# Patient Record
Sex: FEMALE | Race: WHITE | NOT HISPANIC OR LATINO
[De-identification: names, ages, dates, MRNs, and addresses within clinical notes are randomized per-mention and may not be internally consistent; named-entity substitution may affect disease eponyms.]

---

## 2017-01-04 ENCOUNTER — APPOINTMENT (OUTPATIENT)
Dept: ENDOCRINOLOGY | Facility: CLINIC | Age: 67
End: 2017-01-04

## 2017-06-08 ENCOUNTER — OUTPATIENT (OUTPATIENT)
Dept: OUTPATIENT SERVICES | Facility: HOSPITAL | Age: 67
LOS: 1 days | Discharge: HOME | End: 2017-06-08

## 2017-06-15 ENCOUNTER — APPOINTMENT (OUTPATIENT)
Dept: ENDOCRINOLOGY | Facility: CLINIC | Age: 67
End: 2017-06-15

## 2017-06-15 ENCOUNTER — OUTPATIENT (OUTPATIENT)
Dept: OUTPATIENT SERVICES | Facility: HOSPITAL | Age: 67
LOS: 1 days | Discharge: HOME | End: 2017-06-15

## 2017-06-15 VITALS
HEIGHT: 60 IN | SYSTOLIC BLOOD PRESSURE: 110 MMHG | HEART RATE: 75 BPM | BODY MASS INDEX: 39.27 KG/M2 | WEIGHT: 200 LBS | DIASTOLIC BLOOD PRESSURE: 68 MMHG

## 2017-06-17 RX ORDER — ASPIRIN ENTERIC COATED TABLETS 81 MG 81 MG/1
81 TABLET, DELAYED RELEASE ORAL DAILY
Qty: 100 | Refills: 2 | Status: ACTIVE | COMMUNITY
Start: 2017-06-15

## 2017-07-12 DIAGNOSIS — R52 PAIN, UNSPECIFIED: ICD-10-CM

## 2017-10-03 ENCOUNTER — OUTPATIENT (OUTPATIENT)
Dept: OUTPATIENT SERVICES | Facility: HOSPITAL | Age: 67
LOS: 1 days | Discharge: HOME | End: 2017-10-03

## 2017-10-03 DIAGNOSIS — E11.9 TYPE 2 DIABETES MELLITUS WITHOUT COMPLICATIONS: ICD-10-CM

## 2017-10-03 DIAGNOSIS — I50.9 HEART FAILURE, UNSPECIFIED: ICD-10-CM

## 2017-10-16 ENCOUNTER — OUTPATIENT (OUTPATIENT)
Dept: OUTPATIENT SERVICES | Facility: HOSPITAL | Age: 67
LOS: 1 days | Discharge: HOME | End: 2017-10-16

## 2017-10-16 DIAGNOSIS — I50.9 HEART FAILURE, UNSPECIFIED: ICD-10-CM

## 2017-11-07 ENCOUNTER — OUTPATIENT (OUTPATIENT)
Dept: OUTPATIENT SERVICES | Facility: HOSPITAL | Age: 67
LOS: 1 days | Discharge: HOME | End: 2017-11-07

## 2017-11-15 ENCOUNTER — OUTPATIENT (OUTPATIENT)
Dept: OUTPATIENT SERVICES | Facility: HOSPITAL | Age: 67
LOS: 1 days | Discharge: HOME | End: 2017-11-15

## 2017-11-15 DIAGNOSIS — Z12.31 ENCOUNTER FOR SCREENING MAMMOGRAM FOR MALIGNANT NEOPLASM OF BREAST: ICD-10-CM

## 2017-12-08 ENCOUNTER — OUTPATIENT (OUTPATIENT)
Dept: OUTPATIENT SERVICES | Facility: HOSPITAL | Age: 67
LOS: 1 days | Discharge: HOME | End: 2017-12-08

## 2017-12-08 DIAGNOSIS — E78.01 FAMILIAL HYPERCHOLESTEROLEMIA: ICD-10-CM

## 2017-12-08 DIAGNOSIS — E06.3 AUTOIMMUNE THYROIDITIS: ICD-10-CM

## 2017-12-08 DIAGNOSIS — E11.65 TYPE 2 DIABETES MELLITUS WITH HYPERGLYCEMIA: ICD-10-CM

## 2017-12-13 ENCOUNTER — APPOINTMENT (OUTPATIENT)
Dept: ENDOCRINOLOGY | Facility: CLINIC | Age: 67
End: 2017-12-13

## 2017-12-13 ENCOUNTER — OUTPATIENT (OUTPATIENT)
Dept: OUTPATIENT SERVICES | Facility: HOSPITAL | Age: 67
LOS: 1 days | Discharge: HOME | End: 2017-12-13

## 2017-12-13 VITALS
HEART RATE: 79 BPM | HEIGHT: 60 IN | BODY MASS INDEX: 39.07 KG/M2 | SYSTOLIC BLOOD PRESSURE: 135 MMHG | DIASTOLIC BLOOD PRESSURE: 78 MMHG | WEIGHT: 199 LBS

## 2018-04-10 ENCOUNTER — EMERGENCY (EMERGENCY)
Facility: HOSPITAL | Age: 68
LOS: 0 days | Discharge: HOME | End: 2018-04-11
Attending: EMERGENCY MEDICINE | Admitting: INTERNAL MEDICINE

## 2018-04-10 VITALS
RESPIRATION RATE: 16 BRPM | DIASTOLIC BLOOD PRESSURE: 72 MMHG | HEART RATE: 80 BPM | OXYGEN SATURATION: 99 % | SYSTOLIC BLOOD PRESSURE: 148 MMHG | TEMPERATURE: 98 F

## 2018-04-10 VITALS
SYSTOLIC BLOOD PRESSURE: 158 MMHG | RESPIRATION RATE: 17 BRPM | TEMPERATURE: 98 F | DIASTOLIC BLOOD PRESSURE: 72 MMHG | OXYGEN SATURATION: 99 % | HEART RATE: 74 BPM

## 2018-04-10 DIAGNOSIS — R07.9 CHEST PAIN, UNSPECIFIED: ICD-10-CM

## 2018-04-10 DIAGNOSIS — Z98.890 OTHER SPECIFIED POSTPROCEDURAL STATES: Chronic | ICD-10-CM

## 2018-04-10 DIAGNOSIS — E78.5 HYPERLIPIDEMIA, UNSPECIFIED: ICD-10-CM

## 2018-04-10 DIAGNOSIS — E11.9 TYPE 2 DIABETES MELLITUS WITHOUT COMPLICATIONS: ICD-10-CM

## 2018-04-10 DIAGNOSIS — J45.909 UNSPECIFIED ASTHMA, UNCOMPLICATED: ICD-10-CM

## 2018-04-10 DIAGNOSIS — I10 ESSENTIAL (PRIMARY) HYPERTENSION: ICD-10-CM

## 2018-04-10 DIAGNOSIS — R06.02 SHORTNESS OF BREATH: ICD-10-CM

## 2018-04-10 LAB
ALBUMIN SERPL ELPH-MCNC: 3.8 G/DL — SIGNIFICANT CHANGE UP (ref 3.5–5.2)
ALP SERPL-CCNC: 97 U/L — SIGNIFICANT CHANGE UP (ref 30–115)
ALT FLD-CCNC: 19 U/L — SIGNIFICANT CHANGE UP (ref 0–41)
ANION GAP SERPL CALC-SCNC: 12 MMOL/L — SIGNIFICANT CHANGE UP (ref 7–14)
APTT BLD: 30 SEC — SIGNIFICANT CHANGE UP (ref 27–39.2)
AST SERPL-CCNC: 23 U/L — SIGNIFICANT CHANGE UP (ref 0–41)
BASOPHILS # BLD AUTO: 0.03 K/UL — SIGNIFICANT CHANGE UP (ref 0–0.2)
BASOPHILS NFR BLD AUTO: 0.4 % — SIGNIFICANT CHANGE UP (ref 0–1)
BILIRUB SERPL-MCNC: 0.3 MG/DL — SIGNIFICANT CHANGE UP (ref 0.2–1.2)
BUN SERPL-MCNC: 21 MG/DL — HIGH (ref 10–20)
CALCIUM SERPL-MCNC: 9.1 MG/DL — SIGNIFICANT CHANGE UP (ref 8.5–10.1)
CHLORIDE SERPL-SCNC: 99 MMOL/L — SIGNIFICANT CHANGE UP (ref 98–110)
CO2 SERPL-SCNC: 27 MMOL/L — SIGNIFICANT CHANGE UP (ref 17–32)
CREAT SERPL-MCNC: 0.8 MG/DL — SIGNIFICANT CHANGE UP (ref 0.7–1.5)
EOSINOPHIL # BLD AUTO: 0.24 K/UL — SIGNIFICANT CHANGE UP (ref 0–0.7)
EOSINOPHIL NFR BLD AUTO: 2.9 % — SIGNIFICANT CHANGE UP (ref 0–8)
GLUCOSE SERPL-MCNC: 81 MG/DL — SIGNIFICANT CHANGE UP (ref 70–99)
HCT VFR BLD CALC: 35.1 % — LOW (ref 37–47)
HGB BLD-MCNC: 11.2 G/DL — LOW (ref 12–16)
IMM GRANULOCYTES NFR BLD AUTO: 0.4 % — HIGH (ref 0.1–0.3)
INR BLD: 0.95 RATIO — SIGNIFICANT CHANGE UP (ref 0.65–1.3)
LYMPHOCYTES # BLD AUTO: 1.7 K/UL — SIGNIFICANT CHANGE UP (ref 1.2–3.4)
LYMPHOCYTES # BLD AUTO: 20.6 % — SIGNIFICANT CHANGE UP (ref 20.5–51.1)
MAGNESIUM SERPL-MCNC: 2.1 MG/DL — SIGNIFICANT CHANGE UP (ref 1.8–2.4)
MCHC RBC-ENTMCNC: 27.1 PG — SIGNIFICANT CHANGE UP (ref 27–31)
MCHC RBC-ENTMCNC: 31.9 G/DL — LOW (ref 32–37)
MCV RBC AUTO: 84.8 FL — SIGNIFICANT CHANGE UP (ref 81–99)
MONOCYTES # BLD AUTO: 0.59 K/UL — SIGNIFICANT CHANGE UP (ref 0.1–0.6)
MONOCYTES NFR BLD AUTO: 7.2 % — SIGNIFICANT CHANGE UP (ref 1.7–9.3)
NEUTROPHILS # BLD AUTO: 5.65 K/UL — SIGNIFICANT CHANGE UP (ref 1.4–6.5)
NEUTROPHILS NFR BLD AUTO: 68.5 % — SIGNIFICANT CHANGE UP (ref 42.2–75.2)
NRBC # BLD: 0 /100 WBCS — SIGNIFICANT CHANGE UP (ref 0–0)
NT-PROBNP SERPL-SCNC: 204 PG/ML — SIGNIFICANT CHANGE UP (ref 0–300)
PLATELET # BLD AUTO: 278 K/UL — SIGNIFICANT CHANGE UP (ref 130–400)
POTASSIUM SERPL-MCNC: 4.1 MMOL/L — SIGNIFICANT CHANGE UP (ref 3.5–5)
POTASSIUM SERPL-SCNC: 4.1 MMOL/L — SIGNIFICANT CHANGE UP (ref 3.5–5)
PROT SERPL-MCNC: 6.8 G/DL — SIGNIFICANT CHANGE UP (ref 6–8)
PROTHROM AB SERPL-ACNC: 10.3 SEC — SIGNIFICANT CHANGE UP (ref 9.95–12.87)
RBC # BLD: 4.14 M/UL — LOW (ref 4.2–5.4)
RBC # FLD: 19.4 % — HIGH (ref 11.5–14.5)
SODIUM SERPL-SCNC: 138 MMOL/L — SIGNIFICANT CHANGE UP (ref 135–146)
TROPONIN T SERPL-MCNC: <0.01 NG/ML — SIGNIFICANT CHANGE UP
TROPONIN T SERPL-MCNC: <0.01 NG/ML — SIGNIFICANT CHANGE UP
WBC # BLD: 8.24 K/UL — SIGNIFICANT CHANGE UP (ref 4.8–10.8)
WBC # FLD AUTO: 8.24 K/UL — SIGNIFICANT CHANGE UP (ref 4.8–10.8)

## 2018-04-10 RX ORDER — ACETAMINOPHEN 500 MG
975 TABLET ORAL ONCE
Qty: 0 | Refills: 0 | Status: COMPLETED | OUTPATIENT
Start: 2018-04-10 | End: 2018-04-10

## 2018-04-10 RX ADMIN — Medication 975 MILLIGRAM(S): at 20:40

## 2018-04-10 NOTE — ED ADULT NURSE NOTE - OBJECTIVE STATEMENT
Pt presents to the ED with midsternal CP and SOB, worsening over the past few days, associated with b/l lower extremity swelling

## 2018-04-11 NOTE — ED PROVIDER NOTE - PHYSICAL EXAMINATION
VITAL SIGNS: I have reviewed nursing notes and confirm.  CONSTITUTIONAL: Well-developed; well-nourished; in no acute distress.  SKIN: Skin exam is warm and dry, no acute rash.  HEAD: Normocephalic; atraumatic.  EYES: PERRL, EOM intact; conjunctiva and sclera clear.  ENT: No nasal discharge; airway clear  NECK: Supple; non tender.  CARD:  Regular rate and rhythm.  RESP: No wheezes, rales or rhonchi.  ABD: Normal bowel sounds; soft; non-distended; non-tender; obese  EXT: Normal ROM. 1+ pitting edema to mid shin  NEURO: Alert, oriented. Grossly unremarkable. No focal deficits.  PSYCH: Cooperative, appropriate.

## 2018-04-11 NOTE — ED PROVIDER NOTE - NS ED ROS FT
Constitutional: no fever, chills, no recent weight loss  Cardiac: see HPI  Respiratory: see HPI  GI: No nausea, vomiting, diarrhea or abdominal pain.  : No dysuria, frequency, urgency or hematuria  MS: no pain to back or extremities, no loss of ROM, no weakness  Neuro: No headache or weakness.   Skin: No skin rash.  Except as documented in the HPI, all other systems are negative.

## 2018-04-11 NOTE — ED PROVIDER NOTE - MEDICAL DECISION MAKING DETAILS
66 yo F complicated history, chronic dyspnea, following with pulm here for chest pain described as sharp, intermittently over last 2 weeks. EKG is not acutely ischemic, dyspnea unchanged from previous, will check labs, likely trop x 2 given duration of sx and consider obs vs dc with close follow up.

## 2018-04-11 NOTE — ED PROVIDER NOTE - OBJECTIVE STATEMENT
68 yo F with history of HTN, HLD asthma, DM II, chronic dyspnea, following up with  66 yo F with history of HTN, HLD asthma, DM II, chronic dyspnea, following up with Dr. Teresa, here for chest pain over the last 2 weeks. Patient was being assessed by a nurse for yearly insurance physical and when she endorsed her sx to her she referred her to the ER. No nausea, vomiting, dizziness, new shortness of breath, diaphoresis.     No recent travel, PE history, recent trauma. Has chronic LE edema, no new swelling

## 2018-04-24 ENCOUNTER — OUTPATIENT (OUTPATIENT)
Dept: OUTPATIENT SERVICES | Facility: HOSPITAL | Age: 68
LOS: 1 days | Discharge: HOME | End: 2018-04-24

## 2018-04-24 DIAGNOSIS — Z98.890 OTHER SPECIFIED POSTPROCEDURAL STATES: Chronic | ICD-10-CM

## 2018-04-24 DIAGNOSIS — E11.9 TYPE 2 DIABETES MELLITUS WITHOUT COMPLICATIONS: ICD-10-CM

## 2018-05-18 ENCOUNTER — OUTPATIENT (OUTPATIENT)
Dept: OUTPATIENT SERVICES | Facility: HOSPITAL | Age: 68
LOS: 1 days | Discharge: HOME | End: 2018-05-18

## 2018-05-18 DIAGNOSIS — E66.9 OBESITY, UNSPECIFIED: ICD-10-CM

## 2018-05-18 DIAGNOSIS — E11.9 TYPE 2 DIABETES MELLITUS WITHOUT COMPLICATIONS: ICD-10-CM

## 2018-05-18 DIAGNOSIS — Z98.890 OTHER SPECIFIED POSTPROCEDURAL STATES: Chronic | ICD-10-CM

## 2018-05-18 DIAGNOSIS — E78.5 HYPERLIPIDEMIA, UNSPECIFIED: ICD-10-CM

## 2018-05-18 DIAGNOSIS — K21.9 GASTRO-ESOPHAGEAL REFLUX DISEASE WITHOUT ESOPHAGITIS: ICD-10-CM

## 2018-05-18 DIAGNOSIS — G47.33 OBSTRUCTIVE SLEEP APNEA (ADULT) (PEDIATRIC): ICD-10-CM

## 2018-05-18 DIAGNOSIS — I10 ESSENTIAL (PRIMARY) HYPERTENSION: ICD-10-CM

## 2018-05-30 ENCOUNTER — OUTPATIENT (OUTPATIENT)
Dept: OUTPATIENT SERVICES | Facility: HOSPITAL | Age: 68
LOS: 1 days | Discharge: HOME | End: 2018-05-30

## 2018-05-30 DIAGNOSIS — E11.65 TYPE 2 DIABETES MELLITUS WITH HYPERGLYCEMIA: ICD-10-CM

## 2018-05-30 DIAGNOSIS — Z98.890 OTHER SPECIFIED POSTPROCEDURAL STATES: Chronic | ICD-10-CM

## 2018-05-30 DIAGNOSIS — E06.3 AUTOIMMUNE THYROIDITIS: ICD-10-CM

## 2018-05-30 DIAGNOSIS — E78.00 PURE HYPERCHOLESTEROLEMIA, UNSPECIFIED: ICD-10-CM

## 2018-06-07 ENCOUNTER — OUTPATIENT (OUTPATIENT)
Dept: OUTPATIENT SERVICES | Facility: HOSPITAL | Age: 68
LOS: 1 days | Discharge: HOME | End: 2018-06-07

## 2018-06-07 ENCOUNTER — APPOINTMENT (OUTPATIENT)
Dept: ENDOCRINOLOGY | Facility: CLINIC | Age: 68
End: 2018-06-07

## 2018-06-07 VITALS
HEART RATE: 83 BPM | HEIGHT: 60 IN | SYSTOLIC BLOOD PRESSURE: 128 MMHG | DIASTOLIC BLOOD PRESSURE: 77 MMHG | BODY MASS INDEX: 41.23 KG/M2 | WEIGHT: 210 LBS

## 2018-06-07 DIAGNOSIS — Z98.890 OTHER SPECIFIED POSTPROCEDURAL STATES: Chronic | ICD-10-CM

## 2018-06-07 DIAGNOSIS — E66.01 MORBID (SEVERE) OBESITY DUE TO EXCESS CALORIES: ICD-10-CM

## 2018-06-07 DIAGNOSIS — E11.65 TYPE 2 DIABETES MELLITUS WITH HYPERGLYCEMIA: ICD-10-CM

## 2018-09-05 ENCOUNTER — OUTPATIENT (OUTPATIENT)
Dept: OUTPATIENT SERVICES | Facility: HOSPITAL | Age: 68
LOS: 1 days | Discharge: HOME | End: 2018-09-05

## 2018-09-05 DIAGNOSIS — Z98.890 OTHER SPECIFIED POSTPROCEDURAL STATES: Chronic | ICD-10-CM

## 2018-09-05 DIAGNOSIS — E11.65 TYPE 2 DIABETES MELLITUS WITH HYPERGLYCEMIA: ICD-10-CM

## 2018-09-05 DIAGNOSIS — I50.32 CHRONIC DIASTOLIC (CONGESTIVE) HEART FAILURE: ICD-10-CM

## 2018-09-05 PROBLEM — E78.00 PURE HYPERCHOLESTEROLEMIA, UNSPECIFIED: Chronic | Status: ACTIVE | Noted: 2018-04-10

## 2018-09-05 PROBLEM — E11.9 TYPE 2 DIABETES MELLITUS WITHOUT COMPLICATIONS: Chronic | Status: ACTIVE | Noted: 2018-04-10

## 2018-09-05 PROBLEM — I10 ESSENTIAL (PRIMARY) HYPERTENSION: Chronic | Status: ACTIVE | Noted: 2018-04-10

## 2018-09-05 PROBLEM — J45.909 UNSPECIFIED ASTHMA, UNCOMPLICATED: Chronic | Status: ACTIVE | Noted: 2018-04-10

## 2018-10-16 ENCOUNTER — OUTPATIENT (OUTPATIENT)
Dept: OUTPATIENT SERVICES | Facility: HOSPITAL | Age: 68
LOS: 1 days | Discharge: HOME | End: 2018-10-16

## 2018-10-16 DIAGNOSIS — Z98.890 OTHER SPECIFIED POSTPROCEDURAL STATES: Chronic | ICD-10-CM

## 2018-10-16 DIAGNOSIS — E11.65 TYPE 2 DIABETES MELLITUS WITH HYPERGLYCEMIA: ICD-10-CM

## 2018-10-16 DIAGNOSIS — I50.32 CHRONIC DIASTOLIC (CONGESTIVE) HEART FAILURE: ICD-10-CM

## 2018-10-23 ENCOUNTER — OUTPATIENT (OUTPATIENT)
Dept: OUTPATIENT SERVICES | Facility: HOSPITAL | Age: 68
LOS: 1 days | Discharge: HOME | End: 2018-10-23

## 2018-10-23 DIAGNOSIS — G25.0 ESSENTIAL TREMOR: ICD-10-CM

## 2018-10-23 DIAGNOSIS — Z98.890 OTHER SPECIFIED POSTPROCEDURAL STATES: Chronic | ICD-10-CM

## 2018-11-21 ENCOUNTER — OUTPATIENT (OUTPATIENT)
Dept: OUTPATIENT SERVICES | Facility: HOSPITAL | Age: 68
LOS: 1 days | Discharge: HOME | End: 2018-11-21

## 2018-11-21 DIAGNOSIS — G25.0 ESSENTIAL TREMOR: ICD-10-CM

## 2018-11-21 DIAGNOSIS — Z98.890 OTHER SPECIFIED POSTPROCEDURAL STATES: Chronic | ICD-10-CM

## 2018-11-21 DIAGNOSIS — G47.33 OBSTRUCTIVE SLEEP APNEA (ADULT) (PEDIATRIC): ICD-10-CM

## 2018-11-29 ENCOUNTER — OUTPATIENT (OUTPATIENT)
Dept: OUTPATIENT SERVICES | Facility: HOSPITAL | Age: 68
LOS: 1 days | Discharge: HOME | End: 2018-11-29

## 2018-11-29 DIAGNOSIS — Z98.890 OTHER SPECIFIED POSTPROCEDURAL STATES: Chronic | ICD-10-CM

## 2018-11-29 DIAGNOSIS — Z00.00 ENCOUNTER FOR GENERAL ADULT MEDICAL EXAMINATION WITHOUT ABNORMAL FINDINGS: ICD-10-CM

## 2018-11-29 DIAGNOSIS — E11.65 TYPE 2 DIABETES MELLITUS WITH HYPERGLYCEMIA: ICD-10-CM

## 2018-11-29 DIAGNOSIS — E03.8 OTHER SPECIFIED HYPOTHYROIDISM: ICD-10-CM

## 2018-11-30 LAB
ALBUMIN SERPL ELPH-MCNC: 4.2 G/DL
ALP BLD-CCNC: 104 U/L
ALT SERPL-CCNC: 40 U/L
ANION GAP SERPL CALC-SCNC: 16 MMOL/L
AST SERPL-CCNC: 40 U/L
BASOPHILS # BLD AUTO: 0.03 K/UL
BASOPHILS NFR BLD AUTO: 0.3 %
BILIRUB SERPL-MCNC: 0.3 MG/DL
BUN SERPL-MCNC: 16 MG/DL
CALCIUM SERPL-MCNC: 9.9 MG/DL
CHLORIDE SERPL-SCNC: 99 MMOL/L
CHOLEST SERPL-MCNC: 169 MG/DL
CHOLEST/HDLC SERPL: 2.4 RATIO
CO2 SERPL-SCNC: 30 MMOL/L
CREAT SERPL-MCNC: 1 MG/DL
CREAT SPEC-SCNC: 68 MG/DL
EOSINOPHIL # BLD AUTO: 0.28 K/UL
EOSINOPHIL NFR BLD AUTO: 3.2 %
GLUCOSE SERPL-MCNC: 133 MG/DL
HCT VFR BLD CALC: 40.4 %
HDLC SERPL-MCNC: 70 MG/DL
HGB BLD-MCNC: 12.5 G/DL
IMM GRANULOCYTES NFR BLD AUTO: 0.2 %
LDLC SERPL CALC-MCNC: 97 MG/DL
LYMPHOCYTES # BLD AUTO: 1.67 K/UL
LYMPHOCYTES NFR BLD AUTO: 18.9 %
MAN DIFF?: NORMAL
MCHC RBC-ENTMCNC: 27.5 PG
MCHC RBC-ENTMCNC: 30.9 G/DL
MCV RBC AUTO: 88.8 FL
MICROALBUMIN 24H UR DL<=1MG/L-MCNC: 1.2 MG/DL
MICROALBUMIN/CREAT 24H UR-RTO: 18 MG/G
MONOCYTES # BLD AUTO: 0.61 K/UL
MONOCYTES NFR BLD AUTO: 6.9 %
NEUTROPHILS # BLD AUTO: 6.24 K/UL
NEUTROPHILS NFR BLD AUTO: 70.5 %
PLATELET # BLD AUTO: 322 K/UL
POTASSIUM SERPL-SCNC: 4.7 MMOL/L
PROT SERPL-MCNC: 7.1 G/DL
RBC # BLD: 4.55 M/UL
RBC # FLD: 18.2 %
SODIUM SERPL-SCNC: 145 MMOL/L
TRIGL SERPL-MCNC: 114 MG/DL
TSH SERPL-ACNC: 4.58 UIU/ML
WBC # FLD AUTO: 8.85 K/UL

## 2018-12-20 ENCOUNTER — OUTPATIENT (OUTPATIENT)
Dept: OUTPATIENT SERVICES | Facility: HOSPITAL | Age: 68
LOS: 1 days | Discharge: HOME | End: 2018-12-20

## 2018-12-20 DIAGNOSIS — E11.9 TYPE 2 DIABETES MELLITUS WITHOUT COMPLICATIONS: ICD-10-CM

## 2018-12-20 DIAGNOSIS — Z98.890 OTHER SPECIFIED POSTPROCEDURAL STATES: Chronic | ICD-10-CM

## 2019-01-16 ENCOUNTER — OUTPATIENT (OUTPATIENT)
Dept: OUTPATIENT SERVICES | Facility: HOSPITAL | Age: 69
LOS: 1 days | Discharge: HOME | End: 2019-01-16

## 2019-01-16 ENCOUNTER — APPOINTMENT (OUTPATIENT)
Dept: ENDOCRINOLOGY | Facility: CLINIC | Age: 69
End: 2019-01-16

## 2019-01-16 VITALS
BODY MASS INDEX: 41.03 KG/M2 | HEIGHT: 60 IN | WEIGHT: 209 LBS | HEART RATE: 60 BPM | DIASTOLIC BLOOD PRESSURE: 80 MMHG | SYSTOLIC BLOOD PRESSURE: 120 MMHG

## 2019-01-16 DIAGNOSIS — Z98.890 OTHER SPECIFIED POSTPROCEDURAL STATES: Chronic | ICD-10-CM

## 2019-01-16 NOTE — ASSESSMENT
[Carbohydrate Consistent Diet] : carbohydrate consistent diet [Diabetes Foot Care] : diabetes foot care [Long Term Vascular Complications] : long term vascular complications of diabetes [FreeTextEntry1] : no changes, needs weight loss., increase l-thyroxine dosage.

## 2019-01-16 NOTE — HISTORY OF PRESENT ILLNESS
[FreeTextEntry1] : 69 yo female with pmh of hypothryoidism, DMII, htn, hld. cruz on cpap complainings of constipation, lethargy, hair thinning. Reports good control of BP. Review of blood sugar log shows blood glucose in range of24 today, peak 149 on current meds. \par Requesting refill of pioglitazone. Also on Trulicity and pioglitazone. \par \par med\par trulicity\par invokana 300\par synthroid 50

## 2019-01-28 ENCOUNTER — OUTPATIENT (OUTPATIENT)
Dept: OUTPATIENT SERVICES | Facility: HOSPITAL | Age: 69
LOS: 1 days | Discharge: HOME | End: 2019-01-28

## 2019-01-28 DIAGNOSIS — Z98.890 OTHER SPECIFIED POSTPROCEDURAL STATES: Chronic | ICD-10-CM

## 2019-01-28 DIAGNOSIS — E11.65 TYPE 2 DIABETES MELLITUS WITH HYPERGLYCEMIA: ICD-10-CM

## 2019-01-28 DIAGNOSIS — I50.32 CHRONIC DIASTOLIC (CONGESTIVE) HEART FAILURE: ICD-10-CM

## 2019-01-28 DIAGNOSIS — E11.9 TYPE 2 DIABETES MELLITUS WITHOUT COMPLICATIONS: ICD-10-CM

## 2019-07-01 ENCOUNTER — OUTPATIENT (OUTPATIENT)
Dept: OUTPATIENT SERVICES | Facility: HOSPITAL | Age: 69
LOS: 1 days | Discharge: HOME | End: 2019-07-01

## 2019-07-01 DIAGNOSIS — Z98.890 OTHER SPECIFIED POSTPROCEDURAL STATES: Chronic | ICD-10-CM

## 2019-07-15 ENCOUNTER — OUTPATIENT (OUTPATIENT)
Dept: OUTPATIENT SERVICES | Facility: HOSPITAL | Age: 69
LOS: 1 days | Discharge: HOME | End: 2019-07-15
Payer: MEDICARE

## 2019-07-15 DIAGNOSIS — Z98.890 OTHER SPECIFIED POSTPROCEDURAL STATES: Chronic | ICD-10-CM

## 2019-07-15 DIAGNOSIS — Z12.31 ENCOUNTER FOR SCREENING MAMMOGRAM FOR MALIGNANT NEOPLASM OF BREAST: ICD-10-CM

## 2019-07-15 PROCEDURE — 77067 SCR MAMMO BI INCL CAD: CPT | Mod: 26

## 2019-07-15 PROCEDURE — 77063 BREAST TOMOSYNTHESIS BI: CPT | Mod: 26

## 2019-07-16 DIAGNOSIS — M89.9 DISORDER OF BONE, UNSPECIFIED: ICD-10-CM

## 2019-07-16 DIAGNOSIS — Z13.820 ENCOUNTER FOR SCREENING FOR OSTEOPOROSIS: ICD-10-CM

## 2019-07-16 DIAGNOSIS — Z78.0 ASYMPTOMATIC MENOPAUSAL STATE: ICD-10-CM

## 2019-07-31 ENCOUNTER — OUTPATIENT (OUTPATIENT)
Dept: OUTPATIENT SERVICES | Facility: HOSPITAL | Age: 69
LOS: 1 days | Discharge: HOME | End: 2019-07-31

## 2019-07-31 ENCOUNTER — LABORATORY RESULT (OUTPATIENT)
Age: 69
End: 2019-07-31

## 2019-07-31 ENCOUNTER — OTHER (OUTPATIENT)
Age: 69
End: 2019-07-31

## 2019-07-31 ENCOUNTER — APPOINTMENT (OUTPATIENT)
Dept: ENDOCRINOLOGY | Facility: CLINIC | Age: 69
End: 2019-07-31

## 2019-07-31 VITALS
HEIGHT: 60 IN | SYSTOLIC BLOOD PRESSURE: 134 MMHG | DIASTOLIC BLOOD PRESSURE: 68 MMHG | WEIGHT: 200 LBS | HEART RATE: 68 BPM | BODY MASS INDEX: 39.27 KG/M2

## 2019-07-31 DIAGNOSIS — Z98.890 OTHER SPECIFIED POSTPROCEDURAL STATES: Chronic | ICD-10-CM

## 2019-07-31 NOTE — END OF VISIT
[>50% of Time Spent on Counseling and Coordination of Care for  ___] : Greater than 50% of the encounter time was spent on counseling and coordination of care for [unfilled] [] : Resident [Time Spent: ___ minutes] : I have spent [unfilled] minutes of face to face time with the patient

## 2019-07-31 NOTE — ASSESSMENT
[FreeTextEntry1] : 69 yo female with pmh of hypothryoidism, DMII, htn, hld. cruz on cpap here for follow-up appointment.\par \par #DM2, with some uncontrolled readings\par -Will change Trulicity to Ozempec\par -Check HBa1c, lipid profile, urinalysis, urine albumin/Cr, CBC, CMP\par \par #Hypothyroidism\par -Changed levothyroxine from 25 to 50 last visit\par -Did not do blood work last time, will repeat TSH\par \par #Obesity\par -Advised on importance of healthy diet and exercise\par \par #Follow-up appointment in 6 months [Carbohydrate Consistent Diet] : carbohydrate consistent diet [Long Term Vascular Complications] : long term vascular complications of diabetes [Diabetes Foot Care] : diabetes foot care [Hypoglycemia Management] : hypoglycemia management [Importance of Diet and Exercise] : importance of diet and exercise to improve glycemic control, achieve weight loss and improve cardiovascular health

## 2019-07-31 NOTE — REVIEW OF SYSTEMS
[Blurry Vision] : blurred vision [Polyuria] : polyuria [Fatigue] : no fatigue [Decreased Appetite] : appetite not decreased [Recent Weight Gain (___ Lbs)] : no recent weight gain [Recent Weight Loss (___ Lbs)] : no recent weight loss [Chest Pain] : no chest pain [Palpitations] : no palpitations [Shortness Of Breath] : no shortness of breath [Wheezing] : no wheezing was heard [Nausea] : no nausea [Vomiting] : no vomiting was observed [Constipation] : no constipation [Abdominal Pain] : no abdominal pain [Diarrhea] : no diarrhea [FreeTextEntry3] : Improves with eye glasses

## 2019-07-31 NOTE — PHYSICAL EXAM
[Alert] : alert [No Acute Distress] : no acute distress [Normal Sclera/Conjunctiva] : normal sclera/conjunctiva [No Proptosis] : no proptosis [Normal Hearing] : hearing was normal [Thyroid Not Enlarged] : the thyroid was not enlarged [No Neck Mass] : no neck mass was observed [Normal Rate] : heart rate was normal  [Normal S1, S2] : normal S1 and S2 [Regular Rhythm] : with a regular rhythm [No Stigmata of Cushings Syndrome] : no stigmata of cushings syndrome [Normal Gait] : normal gait [No Rash] : no rash

## 2019-07-31 NOTE — HISTORY OF PRESENT ILLNESS
[FreeTextEntry1] : 67 yo female with pmh of hypothryoidism, DMII, htn, hld. cruz on cpap here for follow-up appointment. Patient reports that for last 2 weeks she has ran out of Tulicity so has not been taking it. Had elevated BS this AM. Reports recent worsening thirst and increased urinary frequency. Denies any constipation, lethargy or hair thinning. [Hypoglycemia] : not hypoglycemic

## 2019-08-02 LAB
ALBUMIN SERPL ELPH-MCNC: 4.5 G/DL
ALP BLD-CCNC: 104 U/L
ALT SERPL-CCNC: 18 U/L
ANION GAP SERPL CALC-SCNC: 15 MMOL/L
AST SERPL-CCNC: 20 U/L
BASOPHILS # BLD AUTO: 0.04 K/UL
BASOPHILS NFR BLD AUTO: 0.4 %
BILIRUB SERPL-MCNC: 0.5 MG/DL
BUN SERPL-MCNC: 18 MG/DL
CALCIUM SERPL-MCNC: 10.5 MG/DL
CHLORIDE SERPL-SCNC: 94 MMOL/L
CHOLEST SERPL-MCNC: 206 MG/DL
CHOLEST/HDLC SERPL: 3.8 RATIO
CO2 SERPL-SCNC: 32 MMOL/L
CREAT SERPL-MCNC: 0.9 MG/DL
CREAT SPEC-SCNC: 60 MG/DL
EOSINOPHIL # BLD AUTO: 0.18 K/UL
EOSINOPHIL NFR BLD AUTO: 1.8 %
ESTIMATED AVERAGE GLUCOSE: 123 MG/DL
GLUCOSE SERPL-MCNC: 85 MG/DL
HBA1C MFR BLD HPLC: 5.9 %
HCT VFR BLD CALC: 42.2 %
HDLC SERPL-MCNC: 54 MG/DL
HGB BLD-MCNC: 13.8 G/DL
IMM GRANULOCYTES NFR BLD AUTO: 0.2 %
LDLC SERPL CALC-MCNC: 137 MG/DL
LYMPHOCYTES # BLD AUTO: 1.75 K/UL
LYMPHOCYTES NFR BLD AUTO: 17.4 %
MAN DIFF?: NORMAL
MCHC RBC-ENTMCNC: 27.9 PG
MCHC RBC-ENTMCNC: 32.7 G/DL
MCV RBC AUTO: 85.3 FL
MICROALBUMIN 24H UR DL<=1MG/L-MCNC: <1.2 MG/DL
MICROALBUMIN/CREAT 24H UR-RTO: NORMAL MG/G
MONOCYTES # BLD AUTO: 0.7 K/UL
MONOCYTES NFR BLD AUTO: 7 %
NEUTROPHILS # BLD AUTO: 7.38 K/UL
NEUTROPHILS NFR BLD AUTO: 73.2 %
PLATELET # BLD AUTO: 267 K/UL
POTASSIUM SERPL-SCNC: 3.7 MMOL/L
PROT SERPL-MCNC: 7.8 G/DL
RBC # BLD: 4.95 M/UL
RBC # FLD: 16.7 %
SODIUM SERPL-SCNC: 141 MMOL/L
TRIGL SERPL-MCNC: 146 MG/DL
TSH SERPL-ACNC: 2.58 UIU/ML
WBC # FLD AUTO: 10.07 K/UL

## 2019-08-02 RX ORDER — CANAGLIFLOZIN 300 MG/1
300 TABLET, FILM COATED ORAL
Qty: 90 | Refills: 3 | Status: DISCONTINUED | COMMUNITY
Start: 2017-12-13 | End: 2019-08-02

## 2019-08-08 DIAGNOSIS — E11.65 TYPE 2 DIABETES MELLITUS WITH HYPERGLYCEMIA: ICD-10-CM

## 2019-08-08 DIAGNOSIS — E06.3 AUTOIMMUNE THYROIDITIS: ICD-10-CM

## 2019-08-08 DIAGNOSIS — E66.01 MORBID (SEVERE) OBESITY DUE TO EXCESS CALORIES: ICD-10-CM

## 2019-08-19 ENCOUNTER — APPOINTMENT (OUTPATIENT)
Dept: PODIATRY | Facility: CLINIC | Age: 69
End: 2019-08-19
Payer: MEDICARE

## 2019-08-19 ENCOUNTER — OUTPATIENT (OUTPATIENT)
Dept: OUTPATIENT SERVICES | Facility: HOSPITAL | Age: 69
LOS: 1 days | Discharge: HOME | End: 2019-08-19

## 2019-08-19 VITALS
WEIGHT: 200 LBS | DIASTOLIC BLOOD PRESSURE: 77 MMHG | SYSTOLIC BLOOD PRESSURE: 108 MMHG | HEIGHT: 60 IN | HEART RATE: 73 BPM | BODY MASS INDEX: 39.27 KG/M2

## 2019-08-19 VITALS
HEIGHT: 60 IN | BODY MASS INDEX: 39.27 KG/M2 | SYSTOLIC BLOOD PRESSURE: 108 MMHG | DIASTOLIC BLOOD PRESSURE: 77 MMHG | WEIGHT: 200 LBS | HEART RATE: 73 BPM

## 2019-08-19 DIAGNOSIS — B35.3 TINEA PEDIS: ICD-10-CM

## 2019-08-19 DIAGNOSIS — E11.9 TYPE 2 DIABETES MELLITUS W/OUT COMPLICATIONS: ICD-10-CM

## 2019-08-19 DIAGNOSIS — L29.9 PRURITUS, UNSPECIFIED: ICD-10-CM

## 2019-08-19 DIAGNOSIS — Z98.890 OTHER SPECIFIED POSTPROCEDURAL STATES: Chronic | ICD-10-CM

## 2019-08-19 PROCEDURE — 99203 OFFICE O/P NEW LOW 30 MIN: CPT

## 2019-08-19 NOTE — PHYSICAL EXAM
[General Appearance - Alert] : alert [General Appearance - In No Acute Distress] : in no acute distress [Nail Clubbing] : no clubbing  or cyanosis of the fingernails [Abnormal Walk] : normal gait [Motor Tone] : muscle strength and tone were normal [Musculoskeletal - Swelling] : no joint swelling seen [Skin Turgor] : normal skin turgor [Skin Color & Pigmentation] : normal skin color and pigmentation [] : no rash [Left Foot Was Examined] : The left foot was examined [Right Foot Was Examined] : The right foot was examined [Normal Appearance] : normal in appearance [Erythema] : not erythematous [Delayed in the Right Toes] : normal in the toes [Normal in Appearance] : normal in appearance [Normal] : normal [Full ROM] : with full range of motion [Swelling] : not swollen [Tenderness] : not tender [Delayed in the Left Toes] : normal in the toes [Vibration Dec.] : normal vibratory sensation at the level of the toes [2+] : 2+ in the dorsalis pedis [Position Sense Dec.] : normal position sense at the level of the toes [Diminished Throughout Right Foot] : normal tactile sensation with monofilament testing throughout right foot [Diminished Throughout Left Foot] : normal tactile sensation with monofilament testing throughout left foot [Deep Tendon Reflexes (DTR)] : deep tendon reflexes were 2+ and symmetric [Sensation] : the sensory exam was normal to light touch and pinprick [No Focal Deficits] : no focal deficits [Affect] : the affect was normal [Oriented To Time, Place, And Person] : oriented to person, place, and time [Impaired Insight] : insight and judgment were intact

## 2019-08-19 NOTE — HISTORY OF PRESENT ILLNESS
[FreeTextEntry1] : A 70 y/o female with pMH Dm2, Hypercholesterolic, and Hypothyroid present to clinic who was referred by Dr. boone, last HgA1c 5.9%.

## 2019-08-28 ENCOUNTER — OUTPATIENT (OUTPATIENT)
Dept: OUTPATIENT SERVICES | Facility: HOSPITAL | Age: 69
LOS: 1 days | Discharge: HOME | End: 2019-08-28

## 2019-08-28 ENCOUNTER — OTHER (OUTPATIENT)
Age: 69
End: 2019-08-28

## 2019-08-28 DIAGNOSIS — Z98.890 OTHER SPECIFIED POSTPROCEDURAL STATES: Chronic | ICD-10-CM

## 2019-09-03 ENCOUNTER — OTHER (OUTPATIENT)
Age: 69
End: 2019-09-03

## 2019-09-24 ENCOUNTER — OTHER (OUTPATIENT)
Age: 69
End: 2019-09-24

## 2019-09-25 ENCOUNTER — OUTPATIENT (OUTPATIENT)
Dept: OUTPATIENT SERVICES | Facility: HOSPITAL | Age: 69
LOS: 1 days | Discharge: HOME | End: 2019-09-25

## 2019-09-25 ENCOUNTER — OTHER (OUTPATIENT)
Age: 69
End: 2019-09-25

## 2019-09-25 DIAGNOSIS — Z98.890 OTHER SPECIFIED POSTPROCEDURAL STATES: Chronic | ICD-10-CM

## 2019-10-23 ENCOUNTER — OTHER (OUTPATIENT)
Age: 69
End: 2019-10-23

## 2019-10-23 ENCOUNTER — OUTPATIENT (OUTPATIENT)
Dept: OUTPATIENT SERVICES | Facility: HOSPITAL | Age: 69
LOS: 1 days | Discharge: HOME | End: 2019-10-23

## 2019-10-23 DIAGNOSIS — Z98.890 OTHER SPECIFIED POSTPROCEDURAL STATES: Chronic | ICD-10-CM

## 2019-11-13 ENCOUNTER — OUTPATIENT (OUTPATIENT)
Dept: OUTPATIENT SERVICES | Facility: HOSPITAL | Age: 69
LOS: 1 days | Discharge: HOME | End: 2019-11-13

## 2019-11-13 ENCOUNTER — OTHER (OUTPATIENT)
Age: 69
End: 2019-11-13

## 2019-11-13 DIAGNOSIS — E11.9 TYPE 2 DIABETES MELLITUS WITHOUT COMPLICATIONS: ICD-10-CM

## 2019-11-13 DIAGNOSIS — I10 ESSENTIAL (PRIMARY) HYPERTENSION: ICD-10-CM

## 2019-11-13 DIAGNOSIS — G47.33 OBSTRUCTIVE SLEEP APNEA (ADULT) (PEDIATRIC): ICD-10-CM

## 2019-11-13 DIAGNOSIS — Z98.890 OTHER SPECIFIED POSTPROCEDURAL STATES: Chronic | ICD-10-CM

## 2019-11-13 DIAGNOSIS — E66.01 MORBID (SEVERE) OBESITY DUE TO EXCESS CALORIES: ICD-10-CM

## 2019-11-13 DIAGNOSIS — K21.9 GASTRO-ESOPHAGEAL REFLUX DISEASE WITHOUT ESOPHAGITIS: ICD-10-CM

## 2019-11-13 DIAGNOSIS — I50.32 CHRONIC DIASTOLIC (CONGESTIVE) HEART FAILURE: ICD-10-CM

## 2019-11-18 ENCOUNTER — APPOINTMENT (OUTPATIENT)
Dept: PODIATRY | Facility: CLINIC | Age: 69
End: 2019-11-18

## 2020-01-08 ENCOUNTER — OTHER (OUTPATIENT)
Age: 70
End: 2020-01-08

## 2020-01-08 ENCOUNTER — OUTPATIENT (OUTPATIENT)
Dept: OUTPATIENT SERVICES | Facility: HOSPITAL | Age: 70
LOS: 1 days | Discharge: HOME | End: 2020-01-08

## 2020-01-08 ENCOUNTER — APPOINTMENT (OUTPATIENT)
Dept: NUTRITION | Facility: CLINIC | Age: 70
End: 2020-01-08

## 2020-01-08 ENCOUNTER — APPOINTMENT (OUTPATIENT)
Dept: ENDOCRINOLOGY | Facility: CLINIC | Age: 70
End: 2020-01-08

## 2020-01-08 VITALS
DIASTOLIC BLOOD PRESSURE: 73 MMHG | HEIGHT: 60 IN | BODY MASS INDEX: 40.05 KG/M2 | HEART RATE: 74 BPM | WEIGHT: 204 LBS | SYSTOLIC BLOOD PRESSURE: 113 MMHG

## 2020-01-08 DIAGNOSIS — Z98.890 OTHER SPECIFIED POSTPROCEDURAL STATES: Chronic | ICD-10-CM

## 2020-01-08 DIAGNOSIS — E11.65 TYPE 2 DIABETES MELLITUS WITH HYPERGLYCEMIA: ICD-10-CM

## 2020-01-08 DIAGNOSIS — E66.01 MORBID (SEVERE) OBESITY DUE TO EXCESS CALORIES: ICD-10-CM

## 2020-01-08 RX ORDER — DULAGLUTIDE 1.5 MG/.5ML
1.5 INJECTION, SOLUTION SUBCUTANEOUS
Qty: 3 | Refills: 3 | Status: DISCONTINUED | COMMUNITY
Start: 2017-12-13 | End: 2020-01-08

## 2020-01-08 NOTE — PHYSICAL EXAM
[Alert] : alert [Well Nourished] : well nourished [No Acute Distress] : no acute distress [Well Developed] : well developed [EOMI] : extra ocular movement intact [Normal Sclera/Conjunctiva] : normal sclera/conjunctiva [Normal Oropharynx] : the oropharynx was normal [No Proptosis] : no proptosis [Thyroid Not Enlarged] : the thyroid was not enlarged [No Accessory Muscle Use] : no accessory muscle use [No Respiratory Distress] : no respiratory distress [No Thyroid Nodules] : there were no palpable thyroid nodules [Clear to Auscultation] : lungs were clear to auscultation bilaterally [Normal Rate] : heart rate was normal  [Regular Rhythm] : with a regular rhythm [Pedal Pulses Normal] : the pedal pulses are present [Normal S1, S2] : normal S1 and S2 [Normal Bowel Sounds] : normal bowel sounds [No Edema] : there was no peripheral edema [Not Tender] : non-tender [Post Cervical Nodes] : posterior cervical nodes [Soft] : abdomen soft [Not Distended] : not distended [Axillary Nodes] : axillary nodes [Normal] : normal and non tender [Anterior Cervical Nodes] : anterior cervical nodes [Spine Straight] : spine straight [No Spinal Tenderness] : no spinal tenderness [No Stigmata of Cushings Syndrome] : no stigmata of cushings syndrome [Normal Gait] : normal gait [Normal Strength/Tone] : muscle strength and tone were normal [Normal Reflexes] : deep tendon reflexes were 2+ and symmetric [Acanthosis Nigricans] : no acanthosis nigricans [No Rash] : no rash [No Tremors] : no tremors [Oriented x3] : oriented to person, place, and time

## 2020-01-08 NOTE — ASSESSMENT
[FreeTextEntry1] : 69 year-old female with PMH of hypothryoidism, T2DM, HTN, HLD, and SCOUT on CPAP heree for follow-up appointment.\par \par # T2DM\par - Changed Trulicity to Ozempec 7/2019. Glucose readings well-controlled reportedly\par - A1c 7/2019: 5.9%\par - Will get repeat labs at today's visit\par \par # Hypothyroidism\par - Changed levothyroxine from 50 to 75 last visit\par - TSH WNL\par - Will repeat bloodwork today\par - Continue Synthroid 75 mcg qD for now\par \par # Obesity\par -Advised on importance of healthy diet and exercise\par \par RTC in 6 months for follow-up.\par \par Counseling: The patient was counseled on carbohydrate consistent diet, hypoglycemia management, diabetes foot care, long term vascular complications of diabetes, importance of diet and exercise to improve glycemic control, achieve weight loss and improve cardiovascular health.  [Carbohydrate Consistent Diet] : carbohydrate consistent diet [Diabetes Foot Care] : diabetes foot care [Importance of Diet and Exercise] : importance of diet and exercise to improve glycemic control, achieve weight loss and improve cardiovascular health [Long Term Vascular Complications] : long term vascular complications of diabetes

## 2020-01-08 NOTE — HISTORY OF PRESENT ILLNESS
[FreeTextEntry1] : 68 year-old female with PMH of hypothryoidism, T2DM, HTN, HLD, and SCOUT on CPAP here for 6-month follow-up appointment. Patient states she is compliant with her medications and checks BS once a day. This AM, her fasting BG was 120 on home monitor.\par \par Admits to recent fever (being actively treated for bacterial URI by primary), fatigue attributable to URI, shortness of breath/cough, pleuritic chest pain, ear pain all attributable to URI. Denies HA, vision changes, lymphadenopathy, dry skin, or constipation. Does report swelling of right lower extremity (had recent duplex and TTE with PCP, has not received results). Denies polyuria but does report increased frequency (normal for her) she attributes to diuretic use.\par \par \par

## 2020-01-09 LAB
ALBUMIN SERPL ELPH-MCNC: 4 G/DL
ALP BLD-CCNC: 97 U/L
ALT SERPL-CCNC: 25 U/L
ANION GAP SERPL CALC-SCNC: 16 MMOL/L
AST SERPL-CCNC: 33 U/L
BASOPHILS # BLD AUTO: 0.02 K/UL
BASOPHILS NFR BLD AUTO: 0.2 %
BILIRUB SERPL-MCNC: 0.5 MG/DL
BUN SERPL-MCNC: 10 MG/DL
CALCIUM SERPL-MCNC: 10.1 MG/DL
CHLORIDE SERPL-SCNC: 98 MMOL/L
CHOLEST SERPL-MCNC: 154 MG/DL
CHOLEST/HDLC SERPL: 3.5 RATIO
CO2 SERPL-SCNC: 29 MMOL/L
CREAT SERPL-MCNC: 0.8 MG/DL
CREAT SPEC-SCNC: 73 MG/DL
EOSINOPHIL # BLD AUTO: 0.22 K/UL
EOSINOPHIL NFR BLD AUTO: 2.5 %
ESTIMATED AVERAGE GLUCOSE: 137 MG/DL
GLUCOSE SERPL-MCNC: 88 MG/DL
HBA1C MFR BLD HPLC: 6.4 %
HCT VFR BLD CALC: 39.3 %
HDLC SERPL-MCNC: 44 MG/DL
HGB BLD-MCNC: 12.6 G/DL
IMM GRANULOCYTES NFR BLD AUTO: 0.6 %
LDLC SERPL CALC-MCNC: 91 MG/DL
LYMPHOCYTES # BLD AUTO: 1.51 K/UL
LYMPHOCYTES NFR BLD AUTO: 17.3 %
MAN DIFF?: NORMAL
MCHC RBC-ENTMCNC: 27.8 PG
MCHC RBC-ENTMCNC: 32.1 G/DL
MCV RBC AUTO: 86.8 FL
MICROALBUMIN 24H UR DL<=1MG/L-MCNC: <1.2 MG/DL
MICROALBUMIN/CREAT 24H UR-RTO: NORMAL MG/G
MONOCYTES # BLD AUTO: 0.51 K/UL
MONOCYTES NFR BLD AUTO: 5.8 %
NEUTROPHILS # BLD AUTO: 6.41 K/UL
NEUTROPHILS NFR BLD AUTO: 73.6 %
PLATELET # BLD AUTO: 341 K/UL
POTASSIUM SERPL-SCNC: 3.5 MMOL/L
PROT SERPL-MCNC: 7 G/DL
RBC # BLD: 4.53 M/UL
RBC # FLD: 16.4 %
SODIUM SERPL-SCNC: 143 MMOL/L
TRIGL SERPL-MCNC: 133 MG/DL
TSH SERPL-ACNC: 2.75 UIU/ML
WBC # FLD AUTO: 8.72 K/UL

## 2020-01-14 ENCOUNTER — OTHER (OUTPATIENT)
Age: 70
End: 2020-01-14

## 2020-01-15 DIAGNOSIS — E11.9 TYPE 2 DIABETES MELLITUS WITHOUT COMPLICATIONS: ICD-10-CM

## 2020-03-21 ENCOUNTER — RX RENEWAL (OUTPATIENT)
Age: 70
End: 2020-03-21

## 2020-07-08 ENCOUNTER — APPOINTMENT (OUTPATIENT)
Dept: ENDOCRINOLOGY | Facility: CLINIC | Age: 70
End: 2020-07-08

## 2020-08-12 ENCOUNTER — APPOINTMENT (OUTPATIENT)
Dept: NUTRITION | Facility: CLINIC | Age: 70
End: 2020-08-12

## 2020-08-12 ENCOUNTER — OUTPATIENT (OUTPATIENT)
Dept: OUTPATIENT SERVICES | Facility: HOSPITAL | Age: 70
LOS: 1 days | Discharge: HOME | End: 2020-08-12

## 2020-08-12 DIAGNOSIS — Z98.890 OTHER SPECIFIED POSTPROCEDURAL STATES: Chronic | ICD-10-CM

## 2020-08-13 ENCOUNTER — APPOINTMENT (OUTPATIENT)
Dept: NUTRITION | Facility: CLINIC | Age: 70
End: 2020-08-13

## 2020-08-13 ENCOUNTER — OUTPATIENT (OUTPATIENT)
Dept: OUTPATIENT SERVICES | Facility: HOSPITAL | Age: 70
LOS: 1 days | Discharge: HOME | End: 2020-08-13

## 2020-08-13 DIAGNOSIS — Z98.890 OTHER SPECIFIED POSTPROCEDURAL STATES: Chronic | ICD-10-CM

## 2020-08-25 DIAGNOSIS — E11.9 TYPE 2 DIABETES MELLITUS WITHOUT COMPLICATIONS: ICD-10-CM

## 2020-09-02 DIAGNOSIS — E11.9 TYPE 2 DIABETES MELLITUS WITHOUT COMPLICATIONS: ICD-10-CM

## 2020-10-14 ENCOUNTER — APPOINTMENT (OUTPATIENT)
Dept: NUTRITION | Facility: CLINIC | Age: 70
End: 2020-10-14

## 2020-10-14 ENCOUNTER — OUTPATIENT (OUTPATIENT)
Dept: OUTPATIENT SERVICES | Facility: HOSPITAL | Age: 70
LOS: 1 days | Discharge: HOME | End: 2020-10-14

## 2020-10-14 DIAGNOSIS — Z98.890 OTHER SPECIFIED POSTPROCEDURAL STATES: Chronic | ICD-10-CM

## 2020-10-20 DIAGNOSIS — E11.9 TYPE 2 DIABETES MELLITUS WITHOUT COMPLICATIONS: ICD-10-CM

## 2020-11-01 ENCOUNTER — NON-APPOINTMENT (OUTPATIENT)
Age: 70
End: 2020-11-01

## 2020-12-21 ENCOUNTER — RX RENEWAL (OUTPATIENT)
Age: 70
End: 2020-12-21

## 2021-01-17 ENCOUNTER — OUTPATIENT (OUTPATIENT)
Dept: OUTPATIENT SERVICES | Facility: HOSPITAL | Age: 71
LOS: 1 days | Discharge: HOME | End: 2021-01-17

## 2021-01-17 DIAGNOSIS — Z98.890 OTHER SPECIFIED POSTPROCEDURAL STATES: Chronic | ICD-10-CM

## 2021-01-17 DIAGNOSIS — Z11.59 ENCOUNTER FOR SCREENING FOR OTHER VIRAL DISEASES: ICD-10-CM

## 2021-01-23 ENCOUNTER — RX RENEWAL (OUTPATIENT)
Age: 71
End: 2021-01-23

## 2021-02-24 ENCOUNTER — APPOINTMENT (OUTPATIENT)
Dept: ENDOCRINOLOGY | Facility: CLINIC | Age: 71
End: 2021-02-24

## 2021-02-24 ENCOUNTER — OUTPATIENT (OUTPATIENT)
Dept: OUTPATIENT SERVICES | Facility: HOSPITAL | Age: 71
LOS: 1 days | Discharge: HOME | End: 2021-02-24

## 2021-02-24 VITALS — BODY MASS INDEX: 36.13 KG/M2 | WEIGHT: 185 LBS

## 2021-02-24 DIAGNOSIS — Z98.890 OTHER SPECIFIED POSTPROCEDURAL STATES: Chronic | ICD-10-CM

## 2021-02-24 NOTE — PHYSICAL EXAM
[Alert] : alert [No Acute Distress] : no acute distress [Well Developed] : well developed [EOMI] : extra ocular movement intact [No Respiratory Distress] : no respiratory distress [Normal Rate and Effort] : normal respiratory rate and effort [Clear to Auscultation] : lungs were clear to auscultation bilaterally [Normal S1, S2] : normal S1 and S2 [No Murmurs] : no murmurs [Normal Rate] : heart rate was normal [Regular Rhythm] : with a regular rhythm [Normal Bowel Sounds] : normal bowel sounds [Not Tender] : non-tender [Not Distended] : not distended [Soft] : abdomen soft [No Motor Deficits] : the motor exam was normal [Oriented x3] : oriented to person, place, and time

## 2021-02-24 NOTE — HISTORY OF PRESENT ILLNESS
[FreeTextEntry1] : 70 year-old female with PMH of hypothyroidism, T2DM, HTN, HLD, and SCOUT no longer on CPAP here for follow-up visit\par \par Reports compliance with home medication and glucose monitoring. She feels well in general. Last A1C from August 2020 was 6.7%. She reports losing 19 lbs since last visit\par \par \par

## 2021-02-24 NOTE — ASSESSMENT
[FreeTextEntry1] : 70 year-old female with PMH of hypothyroidism, T2DM, HTN, HLD, and SCOUT no longer on CPAP here for follow-up visit\par \par # T2DM\par - Continue Ozempic (changed from Trulicity on 7/2019). Glucose readings well-controlled reported\par - Continue Farxiga 10 mg\par - A1c 8/2020: 6.7%\par - Will get repeat labs\par \par # Hypothyroidism\par - Continue L-thyroxine 75 mcg\par - TSH WNL Jan 2020\par - Will repeat TSH\par \par # Obesity\par - Lost 19 lbs since last visit (intentionally) \par \par RTC in 1 year  [Diabetes Foot Care] : diabetes foot care [Long Term Vascular Complications] : long term vascular complications of diabetes [Carbohydrate Consistent Diet] : carbohydrate consistent diet [Importance of Diet and Exercise] : importance of diet and exercise to improve glycemic control, achieve weight loss and improve cardiovascular health [Retinopathy Screening] : Patient was referred to ophthalmology for retinopathy screening

## 2021-02-24 NOTE — REVIEW OF SYSTEMS
[Fatigue] : no fatigue [Neck Pain] : no neck pain [Decreased Appetite] : appetite not decreased [Chest Pain] : no chest pain [Palpitations] : no palpitations [Nausea] : no nausea [Abdominal Pain] : no abdominal pain [Vomiting] : no vomiting [Diarrhea] : no diarrhea

## 2021-02-25 DIAGNOSIS — E66.01 MORBID (SEVERE) OBESITY DUE TO EXCESS CALORIES: ICD-10-CM

## 2021-02-25 DIAGNOSIS — E03.8 OTHER SPECIFIED HYPOTHYROIDISM: ICD-10-CM

## 2021-02-25 DIAGNOSIS — E11.65 TYPE 2 DIABETES MELLITUS WITH HYPERGLYCEMIA: ICD-10-CM

## 2021-05-19 ENCOUNTER — APPOINTMENT (OUTPATIENT)
Dept: PULMONOLOGY | Facility: CLINIC | Age: 71
End: 2021-05-19
Payer: MEDICARE

## 2021-05-19 VITALS
HEIGHT: 60 IN | OXYGEN SATURATION: 95 % | WEIGHT: 193 LBS | RESPIRATION RATE: 14 BRPM | HEART RATE: 91 BPM | BODY MASS INDEX: 37.89 KG/M2 | DIASTOLIC BLOOD PRESSURE: 74 MMHG | SYSTOLIC BLOOD PRESSURE: 120 MMHG

## 2021-05-19 PROCEDURE — 99213 OFFICE O/P EST LOW 20 MIN: CPT | Mod: 25

## 2021-05-19 PROCEDURE — G0447 BEHAVIOR COUNSEL OBESITY 15M: CPT | Mod: 59

## 2021-05-19 NOTE — HISTORY OF PRESENT ILLNESS
[Mild Persistent] : mild persistent [Doing Well] : doing well [Well Controlled] : Well controlled [Checks Regularly] : The patient checks ~his/her~ peak flow regularly [Good Control] : peak flow has been good [Adherent] : the patient is adherent with ~his/her~ medication regimen [Goals--Doing Well] : the patient is doing well with ~his/her~ asthma goals [PFTs] : pulmonary function tests [Follow-Up - Routine Clinic] : a routine clinic follow-up of [None] : No associated symptoms are reported [Fair Compliance] : fair compliance with treatment [Poor Compliance] : poor compliance with treatment [Fair Tolerance] : fair tolerance of treatment [Fair Symptom Control] : fair symptom control [de-identified] : APAP

## 2021-05-19 NOTE — ASSESSMENT
[FreeTextEntry1] : Mild persistent asthma compensated on Arnuity 200\par HO SCOUT not very compliant with APAP

## 2021-05-19 NOTE — COUNSELING
[Good understanding] : Patient has a good understanding of disease, goals and obesity follow-up plan [FreeTextEntry4] : 15

## 2021-06-15 ENCOUNTER — LABORATORY RESULT (OUTPATIENT)
Age: 71
End: 2021-06-15

## 2021-06-15 ENCOUNTER — OUTPATIENT (OUTPATIENT)
Dept: OUTPATIENT SERVICES | Facility: HOSPITAL | Age: 71
LOS: 1 days | Discharge: HOME | End: 2021-06-15

## 2021-06-15 DIAGNOSIS — Z98.890 OTHER SPECIFIED POSTPROCEDURAL STATES: Chronic | ICD-10-CM

## 2021-06-15 DIAGNOSIS — Z11.59 ENCOUNTER FOR SCREENING FOR OTHER VIRAL DISEASES: ICD-10-CM

## 2021-06-18 ENCOUNTER — OUTPATIENT (OUTPATIENT)
Dept: OUTPATIENT SERVICES | Facility: HOSPITAL | Age: 71
LOS: 1 days | Discharge: HOME | End: 2021-06-18
Payer: MEDICARE

## 2021-06-18 DIAGNOSIS — R06.02 SHORTNESS OF BREATH: ICD-10-CM

## 2021-06-18 DIAGNOSIS — Z98.890 OTHER SPECIFIED POSTPROCEDURAL STATES: Chronic | ICD-10-CM

## 2021-06-18 PROCEDURE — 94729 DIFFUSING CAPACITY: CPT | Mod: 26

## 2021-06-18 PROCEDURE — 94727 GAS DIL/WSHOT DETER LNG VOL: CPT | Mod: 26

## 2021-06-18 PROCEDURE — 94060 EVALUATION OF WHEEZING: CPT | Mod: 26

## 2021-06-30 ENCOUNTER — APPOINTMENT (OUTPATIENT)
Age: 71
End: 2021-06-30
Payer: MEDICARE

## 2021-06-30 VITALS
WEIGHT: 196 LBS | OXYGEN SATURATION: 95 % | HEART RATE: 94 BPM | SYSTOLIC BLOOD PRESSURE: 110 MMHG | RESPIRATION RATE: 12 BRPM | BODY MASS INDEX: 38.48 KG/M2 | HEIGHT: 60 IN | DIASTOLIC BLOOD PRESSURE: 60 MMHG

## 2021-06-30 PROCEDURE — 99213 OFFICE O/P EST LOW 20 MIN: CPT

## 2021-06-30 NOTE — HISTORY OF PRESENT ILLNESS
[Mild Persistent] : mild persistent [Doing Well] : doing well [Well Controlled] : Well controlled [Checks Regularly] : The patient checks ~his/her~ peak flow regularly [Good Control] : peak flow has been good [Adherent] : the patient is adherent with ~his/her~ medication regimen [Goals--Doing Well] : the patient is doing well with ~his/her~ asthma goals [PFTs] : pulmonary function tests [Follow-Up - Routine Clinic] : a routine clinic follow-up of [None] : No associated symptoms are reported [Fair Compliance] : fair compliance with treatment [Poor Compliance] : poor compliance with treatment [Fair Tolerance] : fair tolerance of treatment [Fair Symptom Control] : fair symptom control [de-identified] : APAP

## 2021-09-29 ENCOUNTER — APPOINTMENT (OUTPATIENT)
Age: 71
End: 2021-09-29
Payer: MEDICARE

## 2021-09-29 VITALS
SYSTOLIC BLOOD PRESSURE: 120 MMHG | HEART RATE: 95 BPM | WEIGHT: 190 LBS | BODY MASS INDEX: 37.3 KG/M2 | RESPIRATION RATE: 12 BRPM | DIASTOLIC BLOOD PRESSURE: 60 MMHG | OXYGEN SATURATION: 96 % | HEIGHT: 60 IN

## 2021-09-29 PROCEDURE — 99213 OFFICE O/P EST LOW 20 MIN: CPT

## 2021-09-29 NOTE — ASSESSMENT
[FreeTextEntry1] : Mild persistent asthma compensated.  Tolerated deescalation \par HO SCOUT not very compliant with APAP

## 2021-09-29 NOTE — HISTORY OF PRESENT ILLNESS
[Mild Persistent] : mild persistent [Doing Well] : doing well [Well Controlled] : Well controlled [Checks Regularly] : The patient checks ~his/her~ peak flow regularly [Good Control] : peak flow has been good [Adherent] : the patient is adherent with ~his/her~ medication regimen [Goals--Doing Well] : the patient is doing well with ~his/her~ asthma goals [PFTs] : pulmonary function tests [Follow-Up - Routine Clinic] : a routine clinic follow-up of [None] : No associated symptoms are reported [Fair Compliance] : fair compliance with treatment [Poor Compliance] : poor compliance with treatment [Fair Tolerance] : fair tolerance of treatment [Fair Symptom Control] : fair symptom control [de-identified] : APAP

## 2021-11-18 ENCOUNTER — APPOINTMENT (OUTPATIENT)
Age: 71
End: 2021-11-18

## 2021-12-10 ENCOUNTER — RX RENEWAL (OUTPATIENT)
Age: 71
End: 2021-12-10

## 2022-02-16 ENCOUNTER — APPOINTMENT (OUTPATIENT)
Age: 72
End: 2022-02-16
Payer: MEDICARE

## 2022-02-16 VITALS
HEART RATE: 85 BPM | OXYGEN SATURATION: 94 % | DIASTOLIC BLOOD PRESSURE: 86 MMHG | SYSTOLIC BLOOD PRESSURE: 120 MMHG | BODY MASS INDEX: 36.91 KG/M2 | HEIGHT: 60 IN | RESPIRATION RATE: 14 BRPM | WEIGHT: 188 LBS

## 2022-02-16 PROCEDURE — 99213 OFFICE O/P EST LOW 20 MIN: CPT

## 2022-02-16 NOTE — HISTORY OF PRESENT ILLNESS
[Mild Persistent] : mild persistent [Doing Well] : doing well [Well Controlled] : Well controlled [Checks Regularly] : The patient checks ~his/her~ peak flow regularly [Good Control] : peak flow has been good [Adherent] : the patient is adherent with ~his/her~ medication regimen [Goals--Doing Well] : the patient is doing well with ~his/her~ asthma goals [PFTs] : pulmonary function tests [Follow-Up - Routine Clinic] : a routine clinic follow-up of [None] : No associated symptoms are reported [Fair Compliance] : fair compliance with treatment [Poor Compliance] : poor compliance with treatment [Fair Tolerance] : fair tolerance of treatment [Fair Symptom Control] : fair symptom control [de-identified] : APAP

## 2022-02-16 NOTE — ASSESSMENT
[FreeTextEntry1] : Mild persistent asthma compensated.  On Arnuity 100 \par HO SCOUT not very compliant with APAP

## 2022-02-23 ENCOUNTER — APPOINTMENT (OUTPATIENT)
Dept: ENDOCRINOLOGY | Facility: CLINIC | Age: 72
End: 2022-02-23

## 2022-05-27 ENCOUNTER — NON-APPOINTMENT (OUTPATIENT)
Age: 72
End: 2022-05-27

## 2022-05-31 ENCOUNTER — OUTPATIENT (OUTPATIENT)
Dept: INPATIENT UNIT | Facility: HOSPITAL | Age: 72
LOS: 1 days | Discharge: HOME | End: 2022-05-31

## 2022-05-31 ENCOUNTER — APPOINTMENT (OUTPATIENT)
Age: 72
End: 2022-05-31

## 2022-05-31 ENCOUNTER — TRANSCRIPTION ENCOUNTER (OUTPATIENT)
Age: 72
End: 2022-05-31

## 2022-05-31 VITALS
SYSTOLIC BLOOD PRESSURE: 123 MMHG | TEMPERATURE: 100 F | DIASTOLIC BLOOD PRESSURE: 74 MMHG | OXYGEN SATURATION: 95 % | RESPIRATION RATE: 16 BRPM | HEART RATE: 68 BPM

## 2022-05-31 VITALS
SYSTOLIC BLOOD PRESSURE: 134 MMHG | HEART RATE: 59 BPM | TEMPERATURE: 98 F | OXYGEN SATURATION: 95 % | RESPIRATION RATE: 16 BRPM | DIASTOLIC BLOOD PRESSURE: 66 MMHG

## 2022-05-31 DIAGNOSIS — Z98.890 OTHER SPECIFIED POSTPROCEDURAL STATES: Chronic | ICD-10-CM

## 2022-05-31 DIAGNOSIS — U07.1 COVID-19: ICD-10-CM

## 2022-05-31 RX ORDER — BEBTELOVIMAB 87.5 MG/ML
175 INJECTION, SOLUTION INTRAVENOUS ONCE
Refills: 0 | Status: COMPLETED | OUTPATIENT
Start: 2022-05-31 | End: 2022-05-31

## 2022-05-31 RX ADMIN — BEBTELOVIMAB 175 MILLIGRAM(S): 87.5 INJECTION, SOLUTION INTRAVENOUS at 11:40

## 2022-05-31 NOTE — MONOCLONAL ANTIBODY INFUSION - ASSESSMENT AND PLAN
CC: Monoclonal Antibody Infusion/COVID 19 Positive  71yFemale with PMHx DM, GERD, depression/anxiety, neuropathy, tremor, hypothyroidism, memory issues, CHF, asthma, high chol, glaucoma, constipation on Linzess and symptoms of sore throat, hoarse throat, ear pain, HA, chest heaviness, facial pain. COVID+ 5/28/2022.    exam/findings:  T(C): 37.5 (05-31-22 @ 11:30), Max: 37.5 (05-31-22 @ 11:30)  HR: 68 (05-31-22 @ 11:30) (68 - 68)  BP: 123/74 (05-31-22 @ 11:30) (123/74 - 123/74)  RR: 16 (05-31-22 @ 11:30) (16 - 16)  SpO2: 95% (05-31-22 @ 11:30) (95% - 95%)      PE:   Appearance: NAD	  HEENT:   Normal oral mucosa,   Lymphatic: No lymphadenopathy  Cardiovascular: Normal S1 S2, No JVD, No murmurs, No edema  Respiratory: Lungs clear to auscultation	  Gastrointestinal:  Soft, Non-tender, + BS	, obese  Skin: warm and dry  Neurologic: Non-focal  Extremities: Normal range of motion, no calf tenderness or edema    ASSESSMENT:  Pt is a 71 year old female who is Covid 19 Positive,  was referred by clinic who presents to infusion center for Monoclonal antibody infusion (Bebtelovimab)    PLAN:  - infusion procedure explained to patient   - Consent for monoclonal antibody infusion obtained   - Risk & benefits discussed/all questions answered  - infuse Bebtelovimab as per protocol  - observe patient for one hour post infusion        I have reviewed the Bebtelovimab Emergency Use Authorization (EAU) and I have provided the patient or patient's caregiver with the following information:  1. FDA has authorized emergency use of Bebtelovimab, which is not FDA-approved biologic product.  2. The patient or patient's caregiver has the option to accept or refuse administration of Bebtelovimab   3. The significant known and benefits are unknown.  4. Information on available alternative treatments and risks and benefits of those alternatives.    Discharge:  Patient tolerated infusion well denies complaints of chest pain/SOB/dizziness/ palps  Vital signs stable for discharge home ---  D/C instructions given/ fact sheet included.  Patient to follow-up with PCP as needed.

## 2022-06-01 ENCOUNTER — TRANSCRIPTION ENCOUNTER (OUTPATIENT)
Age: 72
End: 2022-06-01

## 2022-06-25 ENCOUNTER — LABORATORY RESULT (OUTPATIENT)
Age: 72
End: 2022-06-25

## 2022-06-28 ENCOUNTER — APPOINTMENT (OUTPATIENT)
Age: 72
End: 2022-06-28
Payer: MEDICARE

## 2022-06-28 VITALS
DIASTOLIC BLOOD PRESSURE: 88 MMHG | HEART RATE: 81 BPM | RESPIRATION RATE: 14 BRPM | HEIGHT: 60 IN | OXYGEN SATURATION: 94 % | SYSTOLIC BLOOD PRESSURE: 130 MMHG

## 2022-06-28 PROCEDURE — 94729 DIFFUSING CAPACITY: CPT

## 2022-06-28 PROCEDURE — 99214 OFFICE O/P EST MOD 30 MIN: CPT | Mod: 25

## 2022-06-28 PROCEDURE — 94727 GAS DIL/WSHOT DETER LNG VOL: CPT

## 2022-06-28 PROCEDURE — 94010 BREATHING CAPACITY TEST: CPT

## 2022-06-28 NOTE — HISTORY OF PRESENT ILLNESS
[Mild Persistent] : mild persistent [Doing Well] : doing well [Well Controlled] : Well controlled [Checks Regularly] : The patient checks ~his/her~ peak flow regularly [Good Control] : peak flow has been good [Adherent] : the patient is adherent with ~his/her~ medication regimen [Goals--Doing Well] : the patient is doing well with ~his/her~ asthma goals [PFTs] : pulmonary function tests [Follow-Up - Routine Clinic] : a routine clinic follow-up of [None] : No associated symptoms are reported [Fair Compliance] : fair compliance with treatment [Poor Compliance] : poor compliance with treatment [Fair Tolerance] : fair tolerance of treatment [Fair Symptom Control] : fair symptom control [de-identified] : APAP

## 2022-06-28 NOTE — ASSESSMENT
[FreeTextEntry1] : Mild persistent asthma compensated on Arnuity 100.  Tolerated deescalation  \par HO SCOUT not very compliant with APAP

## 2022-07-16 ENCOUNTER — RX RENEWAL (OUTPATIENT)
Age: 72
End: 2022-07-16

## 2022-08-24 ENCOUNTER — OUTPATIENT (OUTPATIENT)
Dept: OUTPATIENT SERVICES | Facility: HOSPITAL | Age: 72
LOS: 1 days | Discharge: HOME | End: 2022-08-24

## 2022-08-24 ENCOUNTER — APPOINTMENT (OUTPATIENT)
Dept: OPHTHALMOLOGY | Facility: CLINIC | Age: 72
End: 2022-08-24

## 2022-08-24 DIAGNOSIS — Z98.890 OTHER SPECIFIED POSTPROCEDURAL STATES: Chronic | ICD-10-CM

## 2022-08-24 PROCEDURE — 92014 COMPRE OPH EXAM EST PT 1/>: CPT

## 2022-09-06 ENCOUNTER — RESULT CHARGE (OUTPATIENT)
Age: 72
End: 2022-09-06

## 2022-09-06 ENCOUNTER — APPOINTMENT (OUTPATIENT)
Dept: CARDIOLOGY | Facility: CLINIC | Age: 72
End: 2022-09-06

## 2022-09-06 PROCEDURE — 99203 OFFICE O/P NEW LOW 30 MIN: CPT | Mod: 25

## 2022-09-06 PROCEDURE — 93000 ELECTROCARDIOGRAM COMPLETE: CPT

## 2022-10-05 ENCOUNTER — OUTPATIENT (OUTPATIENT)
Dept: OUTPATIENT SERVICES | Facility: HOSPITAL | Age: 72
LOS: 1 days | Discharge: HOME | End: 2022-10-05

## 2022-10-05 ENCOUNTER — APPOINTMENT (OUTPATIENT)
Dept: ENDOCRINOLOGY | Facility: CLINIC | Age: 72
End: 2022-10-05

## 2022-10-05 VITALS
WEIGHT: 195 LBS | DIASTOLIC BLOOD PRESSURE: 78 MMHG | SYSTOLIC BLOOD PRESSURE: 140 MMHG | HEART RATE: 75 BPM | BODY MASS INDEX: 38.08 KG/M2

## 2022-10-05 DIAGNOSIS — Z98.890 OTHER SPECIFIED POSTPROCEDURAL STATES: Chronic | ICD-10-CM

## 2022-10-05 NOTE — REVIEW OF SYSTEMS
[Recent Weight Gain (___ Lbs)] : recent weight gain: [unfilled] lbs [Polydipsia] : polydipsia [Hot Flashes] : hot flashes [Negative] : Heme/Lymph [Fatigue] : no fatigue [Fever] : no fever [Dry Eyes] : no dryness [Eye Pain] : no pain [Blurred Vision] : no blurred vision [Cold Intolerance] : no cold intolerance

## 2022-10-05 NOTE — HISTORY OF PRESENT ILLNESS
[FreeTextEntry1] : 72 year-old female with PMH of hypothyroidism, T2DM, HTN, HLD, and SCOUT no longer on CPAP here for follow-up. Patient reports that her Ozempic medication ended on last week of July, and she could no longer get refills of Ozempic from the primary care.\par Patient has the list of FS with her, which demonstrated the trend from 110-120  in july to 150-160 currntly( Sept- October).\par  Patient reports 20 pound weight gain since tsh run out of medications. Her A1C was checked in primary care doctor, she does not remember value, but reports it was in normal range. \par \par

## 2022-10-05 NOTE — PHYSICAL EXAM
[Alert] : alert [Well Nourished] : well nourished [Healthy Appearance] : healthy appearance [Normal Sclera/Conjunctiva] : normal sclera/conjunctiva [EOMI] : extra ocular movement intact [Normal Outer Ear/Nose] : the ears and nose were normal in appearance [Normal Hearing] : hearing was normal [Supple] : the neck was supple [Thyroid Not Enlarged] : the thyroid was not enlarged [No Respiratory Distress] : no respiratory distress [No Accessory Muscle Use] : no accessory muscle use [Normal S1, S2] : normal S1 and S2 [Normal Rate] : heart rate was normal [Regular Rhythm] : with a regular rhythm [Not Tender] : non-tender [Soft] : abdomen soft [Normal Gait] : normal gait [Oriented x3] : oriented to person, place, and time [Normal Affect] : the affect was normal [de-identified] : edema 1+ b/l LE

## 2022-10-05 NOTE — ASSESSMENT
[FreeTextEntry1] : 72 year-old female with PMH of hypothyroidism, T2DM, HTN, HLD, and SCOUT no longer on CPAP here for follow-up. Patient reports that her Ozempic medication ended on last week of July, and she could no longer get refills of Ozempic from the primary care.\par Patient has the list of FS with her, which demonstrated the trend from 110-120  in July to 150-160 currently( Sept- October).\par  Patient reports 20 pound weight gain since tsh run out of medications. Her A1C was checked in primary care doctor, she does not remember value, but reports it was in normal range. \par \par # T2DM\par - refilled Ozempic and Farxiga 10 mg\par - A1c 8/2020: 6.7%\par - Will obtain blood work form Primary care( patient reports has it done in July)\par \par # Hypothyroidism\par - Continue L-thyroxine 75 mcg\par - TSH WNL Jan 2020\par -  obtain blood work form Primary care\par PATIENT NOT SEEN FOR 2 YEARS, JUST HERE BECAUSE SHE RAN OUT OF REFILLS, BUT SHE VISITS THE PULMONARY CLINIC MULTIPLE TIMES, exteremely non compliant.\par \par \par \par  [Diabetes Foot Care] : diabetes foot care [Long Term Vascular Complications] : long term vascular complications of diabetes [Carbohydrate Consistent Diet] : carbohydrate consistent diet [Importance of Diet and Exercise] : importance of diet and exercise to improve glycemic control, achieve weight loss and improve cardiovascular health [Retinopathy Screening] : Patient was referred to ophthalmology for retinopathy screening

## 2022-10-06 DIAGNOSIS — E66.09 OTHER OBESITY DUE TO EXCESS CALORIES: ICD-10-CM

## 2022-10-06 DIAGNOSIS — E03.9 HYPOTHYROIDISM, UNSPECIFIED: ICD-10-CM

## 2022-10-06 DIAGNOSIS — E11.65 TYPE 2 DIABETES MELLITUS WITH HYPERGLYCEMIA: ICD-10-CM

## 2022-11-09 ENCOUNTER — APPOINTMENT (OUTPATIENT)
Dept: CARDIOLOGY | Facility: CLINIC | Age: 72
End: 2022-11-09

## 2022-11-09 PROCEDURE — 99214 OFFICE O/P EST MOD 30 MIN: CPT | Mod: 25

## 2022-11-09 PROCEDURE — 93000 ELECTROCARDIOGRAM COMPLETE: CPT

## 2022-11-09 PROCEDURE — 93880 EXTRACRANIAL BILAT STUDY: CPT

## 2022-11-09 PROCEDURE — 93306 TTE W/DOPPLER COMPLETE: CPT

## 2022-12-13 ENCOUNTER — APPOINTMENT (OUTPATIENT)
Dept: OPHTHALMOLOGY | Facility: CLINIC | Age: 72
End: 2022-12-13

## 2022-12-13 ENCOUNTER — OUTPATIENT (OUTPATIENT)
Dept: OUTPATIENT SERVICES | Facility: HOSPITAL | Age: 72
LOS: 1 days | Discharge: HOME | End: 2022-12-13

## 2022-12-13 DIAGNOSIS — Z98.890 OTHER SPECIFIED POSTPROCEDURAL STATES: Chronic | ICD-10-CM

## 2022-12-13 PROCEDURE — 92012 INTRM OPH EXAM EST PATIENT: CPT

## 2022-12-29 ENCOUNTER — APPOINTMENT (OUTPATIENT)
Age: 72
End: 2022-12-29
Payer: MEDICARE

## 2022-12-29 VITALS
DIASTOLIC BLOOD PRESSURE: 60 MMHG | SYSTOLIC BLOOD PRESSURE: 140 MMHG | OXYGEN SATURATION: 96 % | HEIGHT: 60 IN | RESPIRATION RATE: 12 BRPM | HEART RATE: 87 BPM | WEIGHT: 180 LBS | BODY MASS INDEX: 35.34 KG/M2

## 2022-12-29 PROCEDURE — 99213 OFFICE O/P EST LOW 20 MIN: CPT

## 2022-12-29 NOTE — ASSESSMENT
[FreeTextEntry1] : Mild persistent asthma compensated on Arnuity 100. \par HO SCOUT not very compliant with APAP

## 2022-12-29 NOTE — HISTORY OF PRESENT ILLNESS
[Mild Persistent] : mild persistent [Doing Well] : doing well [Well Controlled] : Well controlled [Checks Regularly] : The patient checks ~his/her~ peak flow regularly [Good Control] : peak flow has been good [Adherent] : the patient is adherent with ~his/her~ medication regimen [Goals--Doing Well] : the patient is doing well with ~his/her~ asthma goals [PFTs] : pulmonary function tests [Follow-Up - Routine Clinic] : a routine clinic follow-up of [None] : No associated symptoms are reported [Fair Compliance] : fair compliance with treatment [Poor Compliance] : poor compliance with treatment [Fair Tolerance] : fair tolerance of treatment [Fair Symptom Control] : fair symptom control [de-identified] : APAP

## 2023-03-13 ENCOUNTER — APPOINTMENT (OUTPATIENT)
Dept: OPHTHALMOLOGY | Facility: CLINIC | Age: 73
End: 2023-03-13
Payer: MEDICARE

## 2023-03-13 ENCOUNTER — APPOINTMENT (OUTPATIENT)
Dept: OPHTHALMOLOGY | Facility: CLINIC | Age: 73
End: 2023-03-13

## 2023-03-13 ENCOUNTER — OUTPATIENT (OUTPATIENT)
Dept: OUTPATIENT SERVICES | Facility: HOSPITAL | Age: 73
LOS: 1 days | End: 2023-03-13
Payer: MEDICARE

## 2023-03-13 DIAGNOSIS — Z98.890 OTHER SPECIFIED POSTPROCEDURAL STATES: Chronic | ICD-10-CM

## 2023-03-13 DIAGNOSIS — H53.40 UNSPECIFIED VISUAL FIELD DEFECTS: ICD-10-CM

## 2023-03-13 PROCEDURE — 92083 EXTENDED VISUAL FIELD XM: CPT

## 2023-03-13 PROCEDURE — 92083 EXTENDED VISUAL FIELD XM: CPT | Mod: 26

## 2023-03-17 DIAGNOSIS — H40.023 OPEN ANGLE WITH BORDERLINE FINDINGS, HIGH RISK, BILATERAL: ICD-10-CM

## 2023-04-05 ENCOUNTER — APPOINTMENT (OUTPATIENT)
Dept: ENDOCRINOLOGY | Facility: CLINIC | Age: 73
End: 2023-04-05
Payer: MEDICARE

## 2023-04-05 ENCOUNTER — APPOINTMENT (OUTPATIENT)
Dept: ENDOCRINOLOGY | Facility: CLINIC | Age: 73
End: 2023-04-05

## 2023-04-05 ENCOUNTER — OUTPATIENT (OUTPATIENT)
Dept: OUTPATIENT SERVICES | Facility: HOSPITAL | Age: 73
LOS: 1 days | End: 2023-04-05
Payer: MEDICARE

## 2023-04-05 VITALS
WEIGHT: 193 LBS | DIASTOLIC BLOOD PRESSURE: 87 MMHG | BODY MASS INDEX: 37.69 KG/M2 | HEART RATE: 89 BPM | SYSTOLIC BLOOD PRESSURE: 132 MMHG

## 2023-04-05 DIAGNOSIS — Z00.00 ENCOUNTER FOR GENERAL ADULT MEDICAL EXAMINATION WITHOUT ABNORMAL FINDINGS: ICD-10-CM

## 2023-04-05 DIAGNOSIS — Z98.890 OTHER SPECIFIED POSTPROCEDURAL STATES: Chronic | ICD-10-CM

## 2023-04-05 PROCEDURE — ZZZZZ: CPT

## 2023-04-05 PROCEDURE — 99214 OFFICE O/P EST MOD 30 MIN: CPT

## 2023-04-05 RX ORDER — SEMAGLUTIDE 1.34 MG/ML
2 INJECTION, SOLUTION SUBCUTANEOUS
Qty: 4 | Refills: 6 | Status: DISCONTINUED | COMMUNITY
Start: 2023-04-05 | End: 2023-04-05

## 2023-04-05 NOTE — PHYSICAL EXAM
[Alert] : alert [Obese] : obese [Normal Sclera/Conjunctiva] : normal sclera/conjunctiva [EOMI] : extra ocular movement intact [Normal Hearing] : hearing was normal [No Accessory Muscle Use] : no accessory muscle use [Normal Rate] : heart rate was normal [Regular Rhythm] : with a regular rhythm [No Edema] : no peripheral edema [Not Tender] : non-tender [Not Distended] : not distended [Soft] : abdomen soft [Normal Strength/Tone] : muscle strength and tone were normal [Oriented x3] : oriented to person, place, and time

## 2023-04-05 NOTE — REVIEW OF SYSTEMS
[SOB on Exertion] : shortness of breath on exertion [Depression] : depression [Anxiety] : anxiety [Decreased Appetite] : appetite not decreased [Recent Weight Gain (___ Lbs)] : no recent weight gain [Recent Weight Loss (___ Lbs)] : no recent weight loss [Visual Field Defect] : no visual field defect [Blurred Vision] : no blurred vision [Hearing Loss] : no hearing loss  [Lower Ext Edema] : no lower extremity edema [Nausea] : no nausea [Abdominal Pain] : no abdominal pain [Vomiting] : no vomiting [Muscle Weakness] : no muscle weakness [Tremors] : no tremors [Pain/Numbness of Digits] : no pain/numbness of digits

## 2023-04-05 NOTE — HISTORY OF PRESENT ILLNESS
[Diabetes] : a history of diabetes [Continuous Glucose Monitoring] : Continuous Glucose Monitoring: Yes [FreeTextEntry1] : 72 year-old female with PMH of hypothyroidism, T2DM, HTN, HLD, and SCOUT no longer on CPAP here for follow-up. Patient was restarted on ozempic and farxiga during her last visit. She has tolerated the medications well and reports that her blood sugars are within normal limits . Her weight has been stable because she has not been following a diet. She recently had a death in the family and has been feeling anxiety/depression. She eats comfort food to cope with depression. Her A1C was checked in primary care doctor, she reports value 5.6%.  [FreeTextEntry5] : 100

## 2023-04-07 DIAGNOSIS — E11.65 TYPE 2 DIABETES MELLITUS WITH HYPERGLYCEMIA: ICD-10-CM

## 2023-04-07 DIAGNOSIS — E66.09 OTHER OBESITY DUE TO EXCESS CALORIES: ICD-10-CM

## 2023-04-07 DIAGNOSIS — E03.9 HYPOTHYROIDISM, UNSPECIFIED: ICD-10-CM

## 2023-04-18 ENCOUNTER — APPOINTMENT (OUTPATIENT)
Dept: OPHTHALMOLOGY | Facility: CLINIC | Age: 73
End: 2023-04-18

## 2023-05-02 ENCOUNTER — APPOINTMENT (OUTPATIENT)
Dept: OPHTHALMOLOGY | Facility: CLINIC | Age: 73
End: 2023-05-02
Payer: MEDICARE

## 2023-05-02 ENCOUNTER — OUTPATIENT (OUTPATIENT)
Dept: OUTPATIENT SERVICES | Facility: HOSPITAL | Age: 73
LOS: 1 days | End: 2023-05-02
Payer: MEDICARE

## 2023-05-02 DIAGNOSIS — Z98.890 OTHER SPECIFIED POSTPROCEDURAL STATES: Chronic | ICD-10-CM

## 2023-05-02 DIAGNOSIS — H53.8 OTHER VISUAL DISTURBANCES: ICD-10-CM

## 2023-05-02 PROCEDURE — 92012 INTRM OPH EXAM EST PATIENT: CPT

## 2023-05-10 DIAGNOSIS — H40.023 OPEN ANGLE WITH BORDERLINE FINDINGS, HIGH RISK, BILATERAL: ICD-10-CM

## 2023-05-16 ENCOUNTER — APPOINTMENT (OUTPATIENT)
Dept: CARDIOLOGY | Facility: CLINIC | Age: 73
End: 2023-05-16
Payer: MEDICARE

## 2023-05-16 DIAGNOSIS — R94.31 ABNORMAL ELECTROCARDIOGRAM [ECG] [EKG]: ICD-10-CM

## 2023-05-16 DIAGNOSIS — I35.9 NONRHEUMATIC AORTIC VALVE DISORDER, UNSPECIFIED: ICD-10-CM

## 2023-05-16 PROCEDURE — 99214 OFFICE O/P EST MOD 30 MIN: CPT | Mod: 25

## 2023-05-16 PROCEDURE — 93000 ELECTROCARDIOGRAM COMPLETE: CPT

## 2023-05-17 PROBLEM — I35.9 NONRHEUMATIC AORTIC VALVE DISORDER: Status: ACTIVE | Noted: 2022-11-10

## 2023-05-17 PROBLEM — R94.31 ABNORMAL ECG: Status: ACTIVE | Noted: 2022-11-10

## 2023-06-06 ENCOUNTER — OUTPATIENT (OUTPATIENT)
Dept: OUTPATIENT SERVICES | Facility: HOSPITAL | Age: 73
LOS: 1 days | End: 2023-06-06
Payer: MEDICARE

## 2023-06-06 DIAGNOSIS — R13.10 DYSPHAGIA, UNSPECIFIED: ICD-10-CM

## 2023-06-06 DIAGNOSIS — Z98.890 OTHER SPECIFIED POSTPROCEDURAL STATES: Chronic | ICD-10-CM

## 2023-06-06 PROCEDURE — 74220 X-RAY XM ESOPHAGUS 1CNTRST: CPT

## 2023-06-06 PROCEDURE — 74220 X-RAY XM ESOPHAGUS 1CNTRST: CPT | Mod: 26

## 2023-06-07 DIAGNOSIS — R13.10 DYSPHAGIA, UNSPECIFIED: ICD-10-CM

## 2023-07-10 ENCOUNTER — APPOINTMENT (OUTPATIENT)
Dept: PULMONOLOGY | Facility: CLINIC | Age: 73
End: 2023-07-10
Payer: MEDICARE

## 2023-07-10 VITALS
RESPIRATION RATE: 14 BRPM | HEART RATE: 104 BPM | HEIGHT: 60 IN | WEIGHT: 185 LBS | BODY MASS INDEX: 36.32 KG/M2 | OXYGEN SATURATION: 95 % | SYSTOLIC BLOOD PRESSURE: 124 MMHG | DIASTOLIC BLOOD PRESSURE: 86 MMHG

## 2023-07-10 PROCEDURE — 99214 OFFICE O/P EST MOD 30 MIN: CPT | Mod: 25

## 2023-07-10 PROCEDURE — 94729 DIFFUSING CAPACITY: CPT

## 2023-07-10 PROCEDURE — 94010 BREATHING CAPACITY TEST: CPT

## 2023-07-10 PROCEDURE — 94727 GAS DIL/WSHOT DETER LNG VOL: CPT

## 2023-07-10 RX ORDER — FLUTICASONE FUROATE 100 UG/1
100 POWDER RESPIRATORY (INHALATION)
Qty: 30 | Refills: 5 | Status: ACTIVE | COMMUNITY
Start: 2021-06-30

## 2023-07-10 NOTE — HISTORY OF PRESENT ILLNESS
[Mild Persistent] : mild persistent [Doing Well] : doing well [Well Controlled] : Well controlled [Checks Regularly] : The patient checks ~his/her~ peak flow regularly [Good Control] : peak flow has been good [Adherent] : the patient is adherent with ~his/her~ medication regimen [Goals--Doing Well] : the patient is doing well with ~his/her~ asthma goals [PFTs] : pulmonary function tests [Follow-Up - Routine Clinic] : a routine clinic follow-up of [None] : No associated symptoms are reported [Fair Compliance] : fair compliance with treatment [Poor Compliance] : poor compliance with treatment [Fair Tolerance] : fair tolerance of treatment [Fair Symptom Control] : fair symptom control [de-identified] : APAP

## 2023-08-23 ENCOUNTER — RX RENEWAL (OUTPATIENT)
Age: 73
End: 2023-08-23

## 2023-09-07 ENCOUNTER — LABORATORY RESULT (OUTPATIENT)
Age: 73
End: 2023-09-07

## 2023-09-12 ENCOUNTER — APPOINTMENT (OUTPATIENT)
Dept: CARDIOLOGY | Facility: CLINIC | Age: 73
End: 2023-09-12
Payer: MEDICARE

## 2023-09-12 PROCEDURE — 99214 OFFICE O/P EST MOD 30 MIN: CPT

## 2023-09-29 ENCOUNTER — OUTPATIENT (OUTPATIENT)
Dept: OUTPATIENT SERVICES | Facility: HOSPITAL | Age: 73
LOS: 1 days | End: 2023-09-29
Payer: MEDICARE

## 2023-09-29 ENCOUNTER — RESULT REVIEW (OUTPATIENT)
Age: 73
End: 2023-09-29

## 2023-09-29 DIAGNOSIS — Z98.890 OTHER SPECIFIED POSTPROCEDURAL STATES: Chronic | ICD-10-CM

## 2023-09-29 DIAGNOSIS — Z00.8 ENCOUNTER FOR OTHER GENERAL EXAMINATION: ICD-10-CM

## 2023-09-29 DIAGNOSIS — R07.9 CHEST PAIN, UNSPECIFIED: ICD-10-CM

## 2023-09-29 PROCEDURE — 78452 HT MUSCLE IMAGE SPECT MULT: CPT

## 2023-09-29 PROCEDURE — 93018 CV STRESS TEST I&R ONLY: CPT

## 2023-09-29 PROCEDURE — A9500: CPT

## 2023-09-29 PROCEDURE — 78452 HT MUSCLE IMAGE SPECT MULT: CPT | Mod: 26

## 2023-09-30 DIAGNOSIS — R07.9 CHEST PAIN, UNSPECIFIED: ICD-10-CM

## 2023-10-11 ENCOUNTER — OUTPATIENT (OUTPATIENT)
Dept: OUTPATIENT SERVICES | Facility: HOSPITAL | Age: 73
LOS: 1 days | End: 2023-10-11
Payer: MEDICARE

## 2023-10-11 ENCOUNTER — APPOINTMENT (OUTPATIENT)
Dept: ENDOCRINOLOGY | Facility: CLINIC | Age: 73
End: 2023-10-11

## 2023-10-11 VITALS
WEIGHT: 185 LBS | SYSTOLIC BLOOD PRESSURE: 118 MMHG | DIASTOLIC BLOOD PRESSURE: 76 MMHG | BODY MASS INDEX: 36.13 KG/M2 | HEART RATE: 66 BPM

## 2023-10-11 DIAGNOSIS — Z98.890 OTHER SPECIFIED POSTPROCEDURAL STATES: Chronic | ICD-10-CM

## 2023-10-11 DIAGNOSIS — Z78.9 OTHER SPECIFIED HEALTH STATUS: ICD-10-CM

## 2023-10-11 DIAGNOSIS — Z00.00 ENCOUNTER FOR GENERAL ADULT MEDICAL EXAMINATION WITHOUT ABNORMAL FINDINGS: ICD-10-CM

## 2023-10-11 DIAGNOSIS — E66.01 MORBID (SEVERE) OBESITY DUE TO EXCESS CALORIES: ICD-10-CM

## 2023-10-11 DIAGNOSIS — E78.00 PURE HYPERCHOLESTEROLEMIA, UNSPECIFIED: ICD-10-CM

## 2023-10-11 DIAGNOSIS — E03.8 OTHER SPECIFIED HYPOTHYROIDISM: ICD-10-CM

## 2023-10-11 PROCEDURE — 99214 OFFICE O/P EST MOD 30 MIN: CPT

## 2023-10-25 ENCOUNTER — APPOINTMENT (OUTPATIENT)
Dept: CARDIOLOGY | Facility: CLINIC | Age: 73
End: 2023-10-25
Payer: MEDICARE

## 2023-10-25 PROCEDURE — 99214 OFFICE O/P EST MOD 30 MIN: CPT | Mod: 25

## 2023-10-25 PROCEDURE — 93000 ELECTROCARDIOGRAM COMPLETE: CPT

## 2023-11-14 ENCOUNTER — APPOINTMENT (OUTPATIENT)
Dept: OPHTHALMOLOGY | Facility: CLINIC | Age: 73
End: 2023-11-14
Payer: MEDICARE

## 2023-11-14 ENCOUNTER — OUTPATIENT (OUTPATIENT)
Dept: OUTPATIENT SERVICES | Facility: HOSPITAL | Age: 73
LOS: 1 days | End: 2023-11-14
Payer: MEDICARE

## 2023-11-14 DIAGNOSIS — Z98.890 OTHER SPECIFIED POSTPROCEDURAL STATES: Chronic | ICD-10-CM

## 2023-11-14 DIAGNOSIS — H53.8 OTHER VISUAL DISTURBANCES: ICD-10-CM

## 2023-11-14 PROCEDURE — 92133 CPTRZD OPH DX IMG PST SGM ON: CPT | Mod: 26

## 2023-11-14 PROCEDURE — 92133 CPTRZD OPH DX IMG PST SGM ON: CPT

## 2023-11-14 PROCEDURE — 92014 COMPRE OPH EXAM EST PT 1/>: CPT

## 2023-11-22 DIAGNOSIS — H25.13 AGE-RELATED NUCLEAR CATARACT, BILATERAL: ICD-10-CM

## 2023-11-22 DIAGNOSIS — H04.123 DRY EYE SYNDROME OF BILATERAL LACRIMAL GLANDS: ICD-10-CM

## 2023-11-22 DIAGNOSIS — H40.023 OPEN ANGLE WITH BORDERLINE FINDINGS, HIGH RISK, BILATERAL: ICD-10-CM

## 2023-12-11 ENCOUNTER — RX RENEWAL (OUTPATIENT)
Age: 73
End: 2023-12-11

## 2024-01-10 ENCOUNTER — APPOINTMENT (OUTPATIENT)
Dept: PULMONOLOGY | Facility: CLINIC | Age: 74
End: 2024-01-10
Payer: MEDICARE

## 2024-01-10 VITALS
OXYGEN SATURATION: 93 % | DIASTOLIC BLOOD PRESSURE: 70 MMHG | HEIGHT: 60 IN | BODY MASS INDEX: 38.28 KG/M2 | SYSTOLIC BLOOD PRESSURE: 132 MMHG | RESPIRATION RATE: 14 BRPM | WEIGHT: 195 LBS | HEART RATE: 77 BPM

## 2024-01-10 DIAGNOSIS — G47.33 OBSTRUCTIVE SLEEP APNEA (ADULT) (PEDIATRIC): ICD-10-CM

## 2024-01-10 DIAGNOSIS — J45.909 UNSPECIFIED ASTHMA, UNCOMPLICATED: ICD-10-CM

## 2024-01-10 PROCEDURE — 99213 OFFICE O/P EST LOW 20 MIN: CPT

## 2024-01-10 RX ORDER — FLUTICASONE FUROATE 100 UG/1
100 POWDER RESPIRATORY (INHALATION) DAILY
Qty: 1 | Refills: 5 | Status: ACTIVE | COMMUNITY
Start: 2024-01-10 | End: 1900-01-01

## 2024-01-10 NOTE — ASSESSMENT
[FreeTextEntry1] : Mild persistent asthma compensated on Arnuity 100. HO SCOUT not very compliant with APAP.

## 2024-01-10 NOTE — HISTORY OF PRESENT ILLNESS
[Mild Persistent] : mild persistent [Doing Well] : doing well [Well Controlled] : Well controlled [Checks Regularly] : The patient checks ~his/her~ peak flow regularly [Good Control] : peak flow has been good [Adherent] : the patient is adherent with ~his/her~ medication regimen [Goals--Doing Well] : the patient is doing well with ~his/her~ asthma goals [PFTs] : pulmonary function tests [Follow-Up - Routine Clinic] : a routine clinic follow-up of [None] : No associated symptoms are reported [Fair Compliance] : fair compliance with treatment [Poor Compliance] : poor compliance with treatment [Fair Tolerance] : fair tolerance of treatment [Fair Symptom Control] : fair symptom control [de-identified] : APAP

## 2024-01-26 ENCOUNTER — APPOINTMENT (OUTPATIENT)
Dept: VASCULAR SURGERY | Facility: CLINIC | Age: 74
End: 2024-01-26
Payer: MEDICARE

## 2024-01-26 PROCEDURE — 93971 EXTREMITY STUDY: CPT

## 2024-02-21 ENCOUNTER — OUTPATIENT (OUTPATIENT)
Dept: OUTPATIENT SERVICES | Facility: HOSPITAL | Age: 74
LOS: 1 days | End: 2024-02-21
Payer: MEDICARE

## 2024-02-21 ENCOUNTER — APPOINTMENT (OUTPATIENT)
Dept: ENDOCRINOLOGY | Facility: CLINIC | Age: 74
End: 2024-02-21

## 2024-02-21 VITALS
SYSTOLIC BLOOD PRESSURE: 136 MMHG | BODY MASS INDEX: 38.47 KG/M2 | DIASTOLIC BLOOD PRESSURE: 77 MMHG | WEIGHT: 197 LBS | HEART RATE: 79 BPM

## 2024-02-21 DIAGNOSIS — E66.01 MORBID (SEVERE) OBESITY DUE TO EXCESS CALORIES: ICD-10-CM

## 2024-02-21 DIAGNOSIS — E11.65 TYPE 2 DIABETES MELLITUS WITH HYPERGLYCEMIA: ICD-10-CM

## 2024-02-21 DIAGNOSIS — Z00.00 ENCOUNTER FOR GENERAL ADULT MEDICAL EXAMINATION WITHOUT ABNORMAL FINDINGS: ICD-10-CM

## 2024-02-21 DIAGNOSIS — E03.8 OTHER SPECIFIED HYPOTHYROIDISM: ICD-10-CM

## 2024-02-21 DIAGNOSIS — Z98.890 OTHER SPECIFIED POSTPROCEDURAL STATES: Chronic | ICD-10-CM

## 2024-02-21 DIAGNOSIS — E06.3 OTHER SPECIFIED HYPOTHYROIDISM: ICD-10-CM

## 2024-02-21 PROCEDURE — 99214 OFFICE O/P EST MOD 30 MIN: CPT

## 2024-02-21 NOTE — HISTORY OF PRESENT ILLNESS
[FreeTextEntry1] : Here for f/u dm on ozempic and farxiga with reported improvement in a1c.  <-- Click to add NO pertinent Family History

## 2024-02-21 NOTE — ASSESSMENT
[FreeTextEntry1] : #DM, improved  -patient reports a1c now 5.1 from 6.1 -continue ozempic 1mg and farxiga  -follow up with primary care otherwise rtc in 1 year.

## 2024-02-23 ENCOUNTER — APPOINTMENT (OUTPATIENT)
Dept: CARDIOLOGY | Facility: CLINIC | Age: 74
End: 2024-02-23
Payer: MEDICARE

## 2024-02-23 VITALS
BODY MASS INDEX: 37.89 KG/M2 | DIASTOLIC BLOOD PRESSURE: 80 MMHG | WEIGHT: 193 LBS | HEIGHT: 60 IN | HEART RATE: 62 BPM | SYSTOLIC BLOOD PRESSURE: 110 MMHG

## 2024-02-23 DIAGNOSIS — Z87.898 PERSONAL HISTORY OF OTHER SPECIFIED CONDITIONS: ICD-10-CM

## 2024-02-23 DIAGNOSIS — I34.9 NONRHEUMATIC MITRAL VALVE DISORDER, UNSPECIFIED: ICD-10-CM

## 2024-02-23 DIAGNOSIS — E78.00 PURE HYPERCHOLESTEROLEMIA, UNSPECIFIED: ICD-10-CM

## 2024-02-23 DIAGNOSIS — I10 ESSENTIAL (PRIMARY) HYPERTENSION: ICD-10-CM

## 2024-02-23 PROCEDURE — 99214 OFFICE O/P EST MOD 30 MIN: CPT | Mod: 25

## 2024-02-23 PROCEDURE — 93000 ELECTROCARDIOGRAM COMPLETE: CPT

## 2024-02-23 RX ORDER — CLOTRIMAZOLE AND BETAMETHASONE DIPROPIONATE 10; .5 MG/G; MG/G
1-0.05 CREAM TOPICAL
Qty: 1 | Refills: 3 | Status: DISCONTINUED | COMMUNITY
Start: 2019-08-19 | End: 2024-02-23

## 2024-02-23 RX ORDER — DAPAGLIFLOZIN 10 MG/1
10 TABLET, FILM COATED ORAL DAILY
Refills: 0 | Status: ACTIVE | COMMUNITY

## 2024-02-23 RX ORDER — DAPAGLIFLOZIN 10 MG/1
10 TABLET, FILM COATED ORAL
Qty: 90 | Refills: 2 | Status: DISCONTINUED | COMMUNITY
Start: 2019-07-31 | End: 2024-02-23

## 2024-02-23 RX ORDER — CLOTRIMAZOLE 10 MG/ML
1 SOLUTION TOPICAL
Qty: 1 | Refills: 5 | Status: DISCONTINUED | COMMUNITY
Start: 2019-08-19 | End: 2024-02-23

## 2024-02-23 RX ORDER — PREGABALIN 75 MG/1
75 CAPSULE ORAL DAILY
Refills: 0 | Status: ACTIVE | COMMUNITY

## 2024-02-23 NOTE — PHYSICAL EXAM
[Well Developed] : well developed [No Acute Distress] : no acute distress [Well Nourished] : well nourished [Normal Venous Pressure] : normal venous pressure [Normal Conjunctiva] : normal conjunctiva [No Carotid Bruit] : no carotid bruit [No Murmur] : no murmur [Normal S1, S2] : normal S1, S2 [No Gallop] : no gallop [No Rub] : no rub [Good Air Entry] : good air entry [Clear Lung Fields] : clear lung fields [No Respiratory Distress] : no respiratory distress  [Soft] : abdomen soft [No Masses/organomegaly] : no masses/organomegaly [Non Tender] : non-tender [Normal Bowel Sounds] : normal bowel sounds [Normal Gait] : normal gait [No Edema] : no edema [No Cyanosis] : no cyanosis [No Focal Deficits] : no focal deficits [Moves all extremities] : moves all extremities [Alert and Oriented] : alert and oriented [Normal Speech] : normal speech [Normal memory] : normal memory

## 2024-02-23 NOTE — HISTORY OF PRESENT ILLNESS
[FreeTextEntry1] : 72 yo with HTN, HLD, asthma, DM, hypothyroidism presents for f/u.  Doing well since last visit.  She reports no chest pain, shortness of breath, palpitations, lightheadedness, syncope, orthopnea, RAFAEL or PND.  She had rheumatic fever as a child.   Labs 2/2024: , LDL 88; Cr, K, LFTs wnl Nuc stress 2023: no ischemia or infarct TTE 2022: Normal LVEF, mild MR, no MS Carotid doppler 2022: no stenoses

## 2024-02-23 NOTE — ASSESSMENT
[FreeTextEntry1] : --- 74 yo with HTN, HLD, asthma, DM, hypothyroidism presents for f/u.  HLD Continue atorvastatin 20 daily.   HTN Continue Benzapril 10 mg daily. Continue diltiazem 120 mg daily.   Hx of RAFAEL Continue furosemide 20 mg daily.  RTC in 6m

## 2024-02-27 DIAGNOSIS — E66.09 OTHER OBESITY DUE TO EXCESS CALORIES: ICD-10-CM

## 2024-02-27 DIAGNOSIS — E03.9 HYPOTHYROIDISM, UNSPECIFIED: ICD-10-CM

## 2024-02-27 DIAGNOSIS — E11.65 TYPE 2 DIABETES MELLITUS WITH HYPERGLYCEMIA: ICD-10-CM

## 2024-02-27 NOTE — ASSESSMENT
Vascular Surgery Outpatient Consultation      Chief Complaint   Patient presents with    Surgical Consult     Large bruised area right thigh, was stepped on by a horse 2-24-24. Has pain, swelling and blistering.       Reason for Consult: Thigh hematoma    Requesting Physician:  Dr. Cantrell    HISTORY OF PRESENT ILLNESS:                The patient is a 35 y.o. male who is referred for evaluation of right thigh hematoma.  The patient developed a right thigh hematoma as injury from horseback riding.  He was evaluated in the emergency department at [facility] and a CT angiogram of the leg was performed that showed no evidence of arterial injury but a large subcutaneous hematoma.  He is accompanied by his mother.  He states that his symptoms have slowly improved.    Past Medical History:        Diagnosis Date    Depression with anxiety      Past Surgical History:        Procedure Laterality Date    CHOLECYSTECTOMY      ROTATOR CUFF REPAIR Right      Current Medications:   Prior to Admission medications    Medication Sig Start Date End Date Taking? Authorizing Provider   ALPRAZolam (NIRAVAM) 2 MG dissolvable tablet Take 1 tablet by mouth nightly as needed for Anxiety. Max Daily Amount: 2 mg   Yes ProviderMohan MD   busPIRone (BUSPAR) 15 MG tablet Take 15 mg by mouth 3 times daily 2/13/24  Yes Mohan Starr MD   Sertraline HCl 150 MG CAPS Take 100 mg by mouth daily   Yes Mohan Starr MD     Allergies:  Patient has no known allergies.    Social History     Socioeconomic History    Marital status:      Spouse name: Not on file    Number of children: Not on file    Years of education: Not on file    Highest education level: Not on file   Occupational History    Not on file   Tobacco Use    Smoking status: Never    Smokeless tobacco: Former     Types: Chew     Quit date: 2/5/2024   Vaping Use    Vaping Use: Never used   Substance and Sexual Activity    Alcohol use: Yes     Comment:  [FreeTextEntry1] : Controlled diabetes mellitus with hyperglycemia (250.80,790.29) (E11.65)\par \par 72 year-old female with PMH of hypothyroidism, T2DM, HTN, HLD, and SCOUT no longer on CPAP here for follow-up. Patient was restarted on ozempic and farxiga during her last visit. She has tolerated the medications well and reports that her blood sugars are within normal limits . \par \par  Her weight has been stable because she has not been following a diet. She recently had a death in the family and has been feeling anxiety/depression. She eats comfort food to cope with depression. Her A1C was checked in primary care doctor, she reports value 5.6%. \par \par # T2DM\par - refilled Ozempic and Farxiga 10 mg\par - A1c 8/2020: 6.7% Reports that her A1C is now 5.6% she follow with her PCP Dr. Hatfield at [facility].\par - Will obtain blood work form Primary care( patient reports has it done in July)\par - RTC in 6 months\par \par # Hypothyroidism\par - Continue L-thyroxine 75 mcg\par - TSH WNL Jan 2020\par - obtain blood work form Primary care\par \par \par  \par \par \par Diabetes Counseling: The patient was counseled on diabetes foot care, long term vascular complications of diabetes, carbohydrate consistent diet and importance of diet and exercise to improve glycemic control, achieve weight loss and improve cardiovascular health. Patient was referred to ophthalmology for retinopathy screening. \par

## 2024-03-19 ENCOUNTER — OUTPATIENT (OUTPATIENT)
Dept: OUTPATIENT SERVICES | Facility: HOSPITAL | Age: 74
LOS: 1 days | End: 2024-03-19
Payer: MEDICARE

## 2024-03-19 ENCOUNTER — APPOINTMENT (OUTPATIENT)
Dept: OPHTHALMOLOGY | Facility: CLINIC | Age: 74
End: 2024-03-19
Payer: MEDICARE

## 2024-03-19 DIAGNOSIS — H53.8 OTHER VISUAL DISTURBANCES: ICD-10-CM

## 2024-03-19 DIAGNOSIS — Z98.890 OTHER SPECIFIED POSTPROCEDURAL STATES: Chronic | ICD-10-CM

## 2024-03-19 PROCEDURE — 92014 COMPRE OPH EXAM EST PT 1/>: CPT

## 2024-03-19 PROCEDURE — 92134 CPTRZ OPH DX IMG PST SGM RTA: CPT

## 2024-04-02 DIAGNOSIS — H40.023 OPEN ANGLE WITH BORDERLINE FINDINGS, HIGH RISK, BILATERAL: ICD-10-CM

## 2024-04-02 DIAGNOSIS — H25.13 AGE-RELATED NUCLEAR CATARACT, BILATERAL: ICD-10-CM

## 2024-04-02 DIAGNOSIS — H04.123 DRY EYE SYNDROME OF BILATERAL LACRIMAL GLANDS: ICD-10-CM

## 2024-04-08 RX ORDER — SEMAGLUTIDE 1.34 MG/ML
4 INJECTION, SOLUTION SUBCUTANEOUS
Qty: 3 | Refills: 1 | Status: ACTIVE | COMMUNITY
Start: 2019-07-31 | End: 1900-01-01

## 2024-06-24 ENCOUNTER — OUTPATIENT (OUTPATIENT)
Dept: OUTPATIENT SERVICES | Facility: HOSPITAL | Age: 74
LOS: 1 days | End: 2024-06-24
Payer: MEDICARE

## 2024-06-24 ENCOUNTER — APPOINTMENT (OUTPATIENT)
Dept: OPHTHALMOLOGY | Facility: CLINIC | Age: 74
End: 2024-06-24
Payer: MEDICARE

## 2024-06-24 DIAGNOSIS — Z98.890 OTHER SPECIFIED POSTPROCEDURAL STATES: Chronic | ICD-10-CM

## 2024-06-24 DIAGNOSIS — H40.023 OPEN ANGLE WITH BORDERLINE FINDINGS, HIGH RISK, BILATERAL: ICD-10-CM

## 2024-06-24 DIAGNOSIS — H53.8 OTHER VISUAL DISTURBANCES: ICD-10-CM

## 2024-06-24 PROCEDURE — 92083 EXTENDED VISUAL FIELD XM: CPT | Mod: 26

## 2024-06-24 PROCEDURE — 92083 EXTENDED VISUAL FIELD XM: CPT

## 2024-07-10 ENCOUNTER — NON-APPOINTMENT (OUTPATIENT)
Age: 74
End: 2024-07-10

## 2024-07-10 ENCOUNTER — APPOINTMENT (OUTPATIENT)
Dept: PULMONOLOGY | Facility: CLINIC | Age: 74
End: 2024-07-10
Payer: MEDICARE

## 2024-07-10 VITALS
HEART RATE: 85 BPM | BODY MASS INDEX: 37.89 KG/M2 | OXYGEN SATURATION: 97 % | DIASTOLIC BLOOD PRESSURE: 82 MMHG | RESPIRATION RATE: 14 BRPM | WEIGHT: 193 LBS | HEIGHT: 60 IN | SYSTOLIC BLOOD PRESSURE: 130 MMHG

## 2024-07-10 DIAGNOSIS — G47.33 OBSTRUCTIVE SLEEP APNEA (ADULT) (PEDIATRIC): ICD-10-CM

## 2024-07-10 DIAGNOSIS — J45.909 UNSPECIFIED ASTHMA, UNCOMPLICATED: ICD-10-CM

## 2024-07-10 PROCEDURE — 94010 BREATHING CAPACITY TEST: CPT

## 2024-07-10 PROCEDURE — 99214 OFFICE O/P EST MOD 30 MIN: CPT | Mod: 25

## 2024-07-23 ENCOUNTER — APPOINTMENT (OUTPATIENT)
Dept: OPHTHALMOLOGY | Facility: CLINIC | Age: 74
End: 2024-07-23
Payer: MEDICARE

## 2024-07-23 PROCEDURE — 92014 COMPRE OPH EXAM EST PT 1/>: CPT

## 2024-07-23 PROCEDURE — 92133 CPTRZD OPH DX IMG PST SGM ON: CPT | Mod: 26

## 2024-08-13 ENCOUNTER — APPOINTMENT (OUTPATIENT)
Dept: CARDIOLOGY | Facility: CLINIC | Age: 74
End: 2024-08-13

## 2024-08-28 ENCOUNTER — APPOINTMENT (OUTPATIENT)
Dept: CARDIOLOGY | Facility: CLINIC | Age: 74
End: 2024-08-28

## 2024-08-29 ENCOUNTER — APPOINTMENT (OUTPATIENT)
Dept: CARDIOLOGY | Facility: CLINIC | Age: 74
End: 2024-08-29
Payer: MEDICARE

## 2024-08-29 VITALS
HEART RATE: 68 BPM | WEIGHT: 193 LBS | BODY MASS INDEX: 37.89 KG/M2 | SYSTOLIC BLOOD PRESSURE: 122 MMHG | HEIGHT: 60 IN | DIASTOLIC BLOOD PRESSURE: 70 MMHG

## 2024-08-29 DIAGNOSIS — E78.00 PURE HYPERCHOLESTEROLEMIA, UNSPECIFIED: ICD-10-CM

## 2024-08-29 DIAGNOSIS — I35.9 NONRHEUMATIC AORTIC VALVE DISORDER, UNSPECIFIED: ICD-10-CM

## 2024-08-29 DIAGNOSIS — G89.29 DORSALGIA, UNSPECIFIED: ICD-10-CM

## 2024-08-29 DIAGNOSIS — I34.9 NONRHEUMATIC MITRAL VALVE DISORDER, UNSPECIFIED: ICD-10-CM

## 2024-08-29 DIAGNOSIS — M54.9 DORSALGIA, UNSPECIFIED: ICD-10-CM

## 2024-08-29 DIAGNOSIS — R20.0 ANESTHESIA OF SKIN: ICD-10-CM

## 2024-08-29 DIAGNOSIS — Z86.39 PERSONAL HISTORY OF OTHER ENDOCRINE, NUTRITIONAL AND METABOLIC DISEASE: ICD-10-CM

## 2024-08-29 DIAGNOSIS — I10 ESSENTIAL (PRIMARY) HYPERTENSION: ICD-10-CM

## 2024-08-29 DIAGNOSIS — Z86.79 PERSONAL HISTORY OF OTHER DISEASES OF THE CIRCULATORY SYSTEM: ICD-10-CM

## 2024-08-29 PROCEDURE — 99214 OFFICE O/P EST MOD 30 MIN: CPT

## 2024-08-29 PROCEDURE — 93000 ELECTROCARDIOGRAM COMPLETE: CPT

## 2024-08-29 RX ORDER — ESKETAMINE HYDROCHLORIDE 28 MG/.2ML
SOLUTION NASAL
Refills: 0 | Status: ACTIVE | COMMUNITY

## 2024-08-29 NOTE — HISTORY OF PRESENT ILLNESS
[FreeTextEntry1] : 73 yo with HTN, HLD, obesity, asthma, DM, hypothyroidism presents for follow up of hypertension, hyperlipidemia, and arm numbness. She reports recent arm numbness that occurs bilaterally at rest associated with chest numbness. These events occur randomly and she doesn't believe it occurs with exertion. She has suffered from falls in the past resulting in back and neck injuries. She attributes the falls to arthritis and weakness of the knees. She has never had a formal xray or imaging of her spine/neck. Besides this, she is looking forward to an upcoming concert and reports her blood pressure is well controlled.

## 2024-08-29 NOTE — ASSESSMENT
[FreeTextEntry1] : --- 73 yo with HTN, HLD, asthma, DM, hypothyroidism presents for follow up and complaints of bilateral arm numbness.   Arm numbness -prior falls with injury of the spine -presentation is more concerning for MSK disc of the cervical/thoracic spine -orthopedic referral to further assess given   Mitral annular calcification/insufficiency/aortic sclerosis -repeat TTE in 2025/26  HLD Continue atorvastatin 20 daily.  LDL at goal  HTN Continue Benzapril 10 mg daily. Continue diltiazem 120 mg daily.  BP well controlled   Hx of RAFAEL Continue furosemide 20 mg daily.  I spent 30 minutes face to face encounter counseling the patient   RTC in 6m

## 2024-08-29 NOTE — HISTORY OF PRESENT ILLNESS
[FreeTextEntry1] : 75 yo with HTN, HLD, obesity, asthma, DM, hypothyroidism presents for follow up of hypertension, hyperlipidemia, and arm numbness. She reports recent arm numbness that occurs bilaterally at rest associated with chest numbness. These events occur randomly and she doesn't believe it occurs with exertion. She has suffered from falls in the past resulting in back and neck injuries. She attributes the falls to arthritis and weakness of the knees. She has never had a formal xray or imaging of her spine/neck. Besides this, she is looking forward to an upcoming concert and reports her blood pressure is well controlled.

## 2024-08-29 NOTE — REVIEW OF SYSTEMS
[Negative] : Psychiatric [Numbness (Hypoesthesia)] : numbness [Dizziness] : no dizziness [Tremor] : no tremor was seen [Convulsions] : no convulsions [Tingling (Paresthesia)] : no tingling [Weakness] : no weakness [Limb Weakness (Paresis)] : no limb weakness (Paresis) [Speech Disturbance] : no speech disturbance

## 2024-08-29 NOTE — PHYSICAL EXAM
[Well Developed] : well developed [Well Nourished] : well nourished [No Acute Distress] : no acute distress [Normal Conjunctiva] : normal conjunctiva [Normal Venous Pressure] : normal venous pressure [No Carotid Bruit] : no carotid bruit [Normal S1, S2] : normal S1, S2 [No Murmur] : no murmur [No Rub] : no rub [No Gallop] : no gallop [Clear Lung Fields] : clear lung fields [Good Air Entry] : good air entry [No Respiratory Distress] : no respiratory distress  [Soft] : abdomen soft [Non Tender] : non-tender [No Masses/organomegaly] : no masses/organomegaly [Normal Bowel Sounds] : normal bowel sounds [Normal Gait] : normal gait [No Edema] : no edema [No Cyanosis] : no cyanosis [Moves all extremities] : moves all extremities [No Focal Deficits] : no focal deficits [Normal Speech] : normal speech [Alert and Oriented] : alert and oriented [Normal memory] : normal memory

## 2024-08-29 NOTE — ASSESSMENT
[FreeTextEntry1] : --- 75 yo with HTN, HLD, asthma, DM, hypothyroidism presents for follow up and complaints of bilateral arm numbness.   Arm numbness -prior falls with injury of the spine -presentation is more concerning for MSK disc of the cervical/thoracic spine -orthopedic referral to further assess given   Mitral annular calcification/insufficiency/aortic sclerosis -repeat TTE in 2025/26  HLD Continue atorvastatin 20 daily.  LDL at goal  HTN Continue Benzapril 10 mg daily. Continue diltiazem 120 mg daily.  BP well controlled   Hx of RAFAEL Continue furosemide 20 mg daily.  I spent 30 minutes face to face encounter counseling the patient   RTC in 6m

## 2024-10-16 ENCOUNTER — APPOINTMENT (OUTPATIENT)
Dept: ORTHOPEDIC SURGERY | Facility: CLINIC | Age: 74
End: 2024-10-16
Payer: MEDICARE

## 2024-10-16 DIAGNOSIS — M48.062 SPINAL STENOSIS, LUMBAR REGION WITH NEUROGENIC CLAUDICATION: ICD-10-CM

## 2024-10-16 DIAGNOSIS — M54.12 DISEASE OF SPINAL CORD, UNSPECIFIED: ICD-10-CM

## 2024-10-16 DIAGNOSIS — G95.9 DISEASE OF SPINAL CORD, UNSPECIFIED: ICD-10-CM

## 2024-10-16 PROCEDURE — 99204 OFFICE O/P NEW MOD 45 MIN: CPT

## 2024-10-16 PROCEDURE — 72050 X-RAY EXAM NECK SPINE 4/5VWS: CPT

## 2024-10-16 PROCEDURE — 72110 X-RAY EXAM L-2 SPINE 4/>VWS: CPT

## 2024-10-29 ENCOUNTER — APPOINTMENT (OUTPATIENT)
Dept: MRI IMAGING | Facility: CLINIC | Age: 74
End: 2024-10-29
Payer: MEDICARE

## 2024-10-29 PROCEDURE — 72141 MRI NECK SPINE W/O DYE: CPT

## 2024-10-29 PROCEDURE — 72148 MRI LUMBAR SPINE W/O DYE: CPT

## 2024-11-06 ENCOUNTER — APPOINTMENT (OUTPATIENT)
Facility: CLINIC | Age: 74
End: 2024-11-06
Payer: MEDICARE

## 2024-11-06 DIAGNOSIS — M54.12 DISEASE OF SPINAL CORD, UNSPECIFIED: ICD-10-CM

## 2024-11-06 DIAGNOSIS — M47.14 OTHER SPONDYLOSIS WITH MYELOPATHY, THORACIC REGION: ICD-10-CM

## 2024-11-06 DIAGNOSIS — M48.062 SPINAL STENOSIS, LUMBAR REGION WITH NEUROGENIC CLAUDICATION: ICD-10-CM

## 2024-11-06 DIAGNOSIS — G95.9 DISEASE OF SPINAL CORD, UNSPECIFIED: ICD-10-CM

## 2024-11-06 PROCEDURE — 99214 OFFICE O/P EST MOD 30 MIN: CPT

## 2024-11-13 ENCOUNTER — APPOINTMENT (OUTPATIENT)
Dept: MRI IMAGING | Facility: CLINIC | Age: 74
End: 2024-11-13
Payer: MEDICARE

## 2024-11-13 PROCEDURE — 72146 MRI CHEST SPINE W/O DYE: CPT

## 2025-01-15 ENCOUNTER — APPOINTMENT (OUTPATIENT)
Dept: PULMONOLOGY | Facility: CLINIC | Age: 75
End: 2025-01-15
Payer: MEDICARE

## 2025-01-15 VITALS
BODY MASS INDEX: 34.36 KG/M2 | HEART RATE: 71 BPM | OXYGEN SATURATION: 96 % | RESPIRATION RATE: 12 BRPM | WEIGHT: 175 LBS | SYSTOLIC BLOOD PRESSURE: 120 MMHG | DIASTOLIC BLOOD PRESSURE: 60 MMHG | HEIGHT: 60 IN

## 2025-01-15 DIAGNOSIS — G47.33 OBSTRUCTIVE SLEEP APNEA (ADULT) (PEDIATRIC): ICD-10-CM

## 2025-01-15 DIAGNOSIS — J45.909 UNSPECIFIED ASTHMA, UNCOMPLICATED: ICD-10-CM

## 2025-01-15 PROCEDURE — 71046 X-RAY EXAM CHEST 2 VIEWS: CPT

## 2025-01-15 PROCEDURE — 99214 OFFICE O/P EST MOD 30 MIN: CPT | Mod: 25

## 2025-01-15 PROCEDURE — 94729 DIFFUSING CAPACITY: CPT

## 2025-01-15 PROCEDURE — 94010 BREATHING CAPACITY TEST: CPT

## 2025-01-15 PROCEDURE — 94727 GAS DIL/WSHOT DETER LNG VOL: CPT

## 2025-01-15 RX ORDER — FLUTICASONE FUROATE 200 UG/1
200 POWDER RESPIRATORY (INHALATION) DAILY
Qty: 1 | Refills: 5 | Status: ACTIVE | COMMUNITY
Start: 2025-01-15 | End: 1900-01-01

## 2025-01-16 ENCOUNTER — OUTPATIENT (OUTPATIENT)
Dept: OUTPATIENT SERVICES | Facility: HOSPITAL | Age: 75
LOS: 1 days | End: 2025-01-16
Payer: MEDICARE

## 2025-01-16 VITALS
WEIGHT: 179.9 LBS | DIASTOLIC BLOOD PRESSURE: 76 MMHG | HEART RATE: 62 BPM | RESPIRATION RATE: 18 BRPM | TEMPERATURE: 98 F | OXYGEN SATURATION: 98 % | SYSTOLIC BLOOD PRESSURE: 143 MMHG | HEIGHT: 58 IN

## 2025-01-16 DIAGNOSIS — Z98.890 OTHER SPECIFIED POSTPROCEDURAL STATES: Chronic | ICD-10-CM

## 2025-01-16 DIAGNOSIS — Z01.818 ENCOUNTER FOR OTHER PREPROCEDURAL EXAMINATION: ICD-10-CM

## 2025-01-16 DIAGNOSIS — M48.062 SPINAL STENOSIS, LUMBAR REGION WITH NEUROGENIC CLAUDICATION: ICD-10-CM

## 2025-01-16 LAB
A1C WITH ESTIMATED AVERAGE GLUCOSE RESULT: 5.7 % — HIGH (ref 4–5.6)
ALBUMIN SERPL ELPH-MCNC: 4.3 G/DL — SIGNIFICANT CHANGE UP (ref 3.5–5.2)
ALP SERPL-CCNC: 124 U/L — HIGH (ref 30–115)
ALT FLD-CCNC: 19 U/L — SIGNIFICANT CHANGE UP (ref 0–41)
ANION GAP SERPL CALC-SCNC: 18 MMOL/L — HIGH (ref 7–14)
APTT BLD: 33 SEC — SIGNIFICANT CHANGE UP (ref 27–39.2)
AST SERPL-CCNC: 24 U/L — SIGNIFICANT CHANGE UP (ref 0–41)
BASOPHILS # BLD AUTO: 0.04 K/UL — SIGNIFICANT CHANGE UP (ref 0–0.2)
BASOPHILS NFR BLD AUTO: 0.5 % — SIGNIFICANT CHANGE UP (ref 0–1)
BILIRUB SERPL-MCNC: 0.5 MG/DL — SIGNIFICANT CHANGE UP (ref 0.2–1.2)
BLD GP AB SCN SERPL QL: SIGNIFICANT CHANGE UP
BUN SERPL-MCNC: 9 MG/DL — LOW (ref 10–20)
CALCIUM SERPL-MCNC: 9.6 MG/DL — SIGNIFICANT CHANGE UP (ref 8.4–10.5)
CHLORIDE SERPL-SCNC: 101 MMOL/L — SIGNIFICANT CHANGE UP (ref 98–110)
CO2 SERPL-SCNC: 24 MMOL/L — SIGNIFICANT CHANGE UP (ref 17–32)
CREAT SERPL-MCNC: 0.7 MG/DL — SIGNIFICANT CHANGE UP (ref 0.7–1.5)
EGFR: 91 ML/MIN/1.73M2 — SIGNIFICANT CHANGE UP
EOSINOPHIL # BLD AUTO: 0.29 K/UL — SIGNIFICANT CHANGE UP (ref 0–0.7)
EOSINOPHIL NFR BLD AUTO: 3.6 % — SIGNIFICANT CHANGE UP (ref 0–8)
ESTIMATED AVERAGE GLUCOSE: 117 MG/DL — HIGH (ref 68–114)
GLUCOSE SERPL-MCNC: 104 MG/DL — HIGH (ref 70–99)
HCT VFR BLD CALC: 42.3 % — SIGNIFICANT CHANGE UP (ref 37–47)
HGB BLD-MCNC: 13.8 G/DL — SIGNIFICANT CHANGE UP (ref 12–16)
IMM GRANULOCYTES NFR BLD AUTO: 0.2 % — SIGNIFICANT CHANGE UP (ref 0.1–0.3)
INR BLD: 0.9 RATIO — SIGNIFICANT CHANGE UP (ref 0.65–1.3)
LYMPHOCYTES # BLD AUTO: 1.46 K/UL — SIGNIFICANT CHANGE UP (ref 1.2–3.4)
LYMPHOCYTES # BLD AUTO: 18 % — LOW (ref 20.5–51.1)
MCHC RBC-ENTMCNC: 28.3 PG — SIGNIFICANT CHANGE UP (ref 27–31)
MCHC RBC-ENTMCNC: 32.6 G/DL — SIGNIFICANT CHANGE UP (ref 32–37)
MCV RBC AUTO: 86.7 FL — SIGNIFICANT CHANGE UP (ref 81–99)
MONOCYTES # BLD AUTO: 0.57 K/UL — SIGNIFICANT CHANGE UP (ref 0.1–0.6)
MONOCYTES NFR BLD AUTO: 7 % — SIGNIFICANT CHANGE UP (ref 1.7–9.3)
MRSA PCR RESULT.: NEGATIVE — SIGNIFICANT CHANGE UP
NEUTROPHILS # BLD AUTO: 5.72 K/UL — SIGNIFICANT CHANGE UP (ref 1.4–6.5)
NEUTROPHILS NFR BLD AUTO: 70.7 % — SIGNIFICANT CHANGE UP (ref 42.2–75.2)
NRBC # BLD: 0 /100 WBCS — SIGNIFICANT CHANGE UP (ref 0–0)
PLATELET # BLD AUTO: 214 K/UL — SIGNIFICANT CHANGE UP (ref 130–400)
PMV BLD: 13.6 FL — HIGH (ref 7.4–10.4)
POTASSIUM SERPL-MCNC: 3.6 MMOL/L — SIGNIFICANT CHANGE UP (ref 3.5–5)
POTASSIUM SERPL-SCNC: 3.6 MMOL/L — SIGNIFICANT CHANGE UP (ref 3.5–5)
PROT SERPL-MCNC: 7.2 G/DL — SIGNIFICANT CHANGE UP (ref 6–8)
PROTHROM AB SERPL-ACNC: 10.6 SEC — SIGNIFICANT CHANGE UP (ref 9.95–12.87)
RBC # BLD: 4.88 M/UL — SIGNIFICANT CHANGE UP (ref 4.2–5.4)
RBC # FLD: 15.3 % — HIGH (ref 11.5–14.5)
SODIUM SERPL-SCNC: 143 MMOL/L — SIGNIFICANT CHANGE UP (ref 135–146)
WBC # BLD: 8.1 K/UL — SIGNIFICANT CHANGE UP (ref 4.8–10.8)
WBC # FLD AUTO: 8.1 K/UL — SIGNIFICANT CHANGE UP (ref 4.8–10.8)

## 2025-01-16 PROCEDURE — 86901 BLOOD TYPING SEROLOGIC RH(D): CPT

## 2025-01-16 PROCEDURE — 93010 ELECTROCARDIOGRAM REPORT: CPT

## 2025-01-16 PROCEDURE — 86900 BLOOD TYPING SEROLOGIC ABO: CPT

## 2025-01-16 PROCEDURE — 36415 COLL VENOUS BLD VENIPUNCTURE: CPT

## 2025-01-16 PROCEDURE — 99214 OFFICE O/P EST MOD 30 MIN: CPT | Mod: 25

## 2025-01-16 PROCEDURE — 83036 HEMOGLOBIN GLYCOSYLATED A1C: CPT

## 2025-01-16 PROCEDURE — 85730 THROMBOPLASTIN TIME PARTIAL: CPT

## 2025-01-16 PROCEDURE — 86850 RBC ANTIBODY SCREEN: CPT

## 2025-01-16 PROCEDURE — 85610 PROTHROMBIN TIME: CPT

## 2025-01-16 PROCEDURE — 80053 COMPREHEN METABOLIC PANEL: CPT

## 2025-01-16 PROCEDURE — 93005 ELECTROCARDIOGRAM TRACING: CPT

## 2025-01-16 PROCEDURE — 87641 MR-STAPH DNA AMP PROBE: CPT

## 2025-01-16 PROCEDURE — 87640 STAPH A DNA AMP PROBE: CPT

## 2025-01-16 PROCEDURE — 85025 COMPLETE CBC W/AUTO DIFF WBC: CPT

## 2025-01-16 RX ORDER — TIMOLOL MALEATE 0.5 %
1 DROPS OPHTHALMIC (EYE)
Refills: 0 | DISCHARGE

## 2025-01-16 NOTE — H&P PST ADULT - REASON FOR ADMISSION
Case Type: OP Block TimeSuite: OR NorthProceduralist: Mark Puga  Confirmed Surgery DateTime: 02- - 0:00PAST DateTime: 01- - 10:30Procedure: LUMBAR 4 LUMBAR 5 POSTERIOR SPINAL INSTRUMENTED FUSION DECOMPRESSION (C ARM)  ERP?: NoLaterality: SpinalLength of Procedure: 240 Minutes  Anesthesia Type: General

## 2025-01-16 NOTE — H&P PST ADULT - MUSCULOSKELETAL
USES WALKER/ROM intact/normal gait/strength 5/5 bilateral upper extremities/strength 5/5 bilateral lower extremities

## 2025-01-16 NOTE — H&P PST ADULT - HISTORY OF PRESENT ILLNESS
PATIENT CURRENTLY DENIES CHEST PAIN,  PALPITATION, SHORTNESS OF BREATH WHEN WALKING, DENIES  DYSURIA, OR UPPER RESPIRATORY INFECTION IN PAST 2 WEEKS  PATIENT REPORTS  FALL IN THE PAST, FELLING ON SADDLE OF THE  HALLWAY AND FROM THE STOOL, PATIENT REPORT SHARP PAIN ON THE SPINE, 9 ON PAIN SCALE, PAIN IS CONSTANT, PAIN IS WORST WHEN SHE SITS, TURNS, VERY HARD TO SIT IN A LOW CHAIR, UNABLE TO LAY ON HER BACK. USES  TYLENOL. FOR RELIEF PRN FOR MINIMAL RELIEF. SURGERY ABOVE PLANNED    Denies travel outside the [place] in the past 30 days  Patient denies any signs or symptoms of COVID 19 and denies contact with known positive individuals.         Anesthesia Alert  YES--Difficult Airway CLASS IV  NO--History of neck surgery or radiation  NO--Limited ROM of neck  NO--History of Malignant hyperthermia  NO--No personal or family history of Pseudocholinesterase deficiency.  NO--Prior Anesthesia Complication  NO--Latex Allergy  NO--Loose teeth  NO--History of Rheumatoid Arthritis  NO--Bleeding risk  NO--SCOUT  NO--Other_____    DASI  5.62    RCRI  1    PT DENIES ANY RASHES, ABRASION, OR OPEN WOUNDS OR CUTS    AS PER THE PT, THIS IS HIS/HER COMPLETE MEDICAL AND SURGICAL HX, INCLUDING MEDICATIONS PRESCRIBED AND OVER THE COUNTER    Patient/Guardian understands the instructions and was given the opportunity to ask questions and have them answered.    pt denies any suicidal ideation or thoughts, pt states not a threat to self or others    The patient confirms they have received, reviewed, and understood their preoperative spine education material.  In the event of any questions or concerns leading up to the surgery, the patient is aware of how to contact the surgeon's office or the [facility].    Opioid Risk Assessment Tool (Female)       Family history of substance abuse            Alcohol (1) NO            Illegal Drugs (2) NO            Prescription drugs (4)  NO       Personal history of substance abuse            Alcohol (3)  NO            Illegal Drugs (4)  NO            Prescription drugs (5)       Age between 16-45 (1)       History of preadolescent sexual abuse (3)  NO       Psychological disease (ADD, ADHD, OCD, Bipolar Disorder, Schizophrenia, Depression) (2)  YES DEPRESSION    Scoring Totals:  2    Low Risk (0-3)

## 2025-01-16 NOTE — H&P PST ADULT - ATTENDING COMMENTS
Informed consent signed  bilateral leg pain  patient has pre op chronic urinary incontinence and typically uses diapers  proceed to OR  all questions answered  risks benefits discussed as per my pre op office note

## 2025-01-16 NOTE — H&P PST ADULT - RESPIRATORY RATE (BREATHS/MIN)
levetiracetam (KEPPRA) 1000 MG tablet    Received Renewal PA request via [employer]  for levetiracetam. (Quantity:120, Day Supply:30)     Insurance: [employer]  Member ID:  [id number]  BIN: [id number]  PCN: [id number]  Group: [id number]     Ran Test claim via [place] & medication Rejects stating prior authorization is required.    Initiated PA in cmm (Key: [id number])    Per Cmm: Available without authorization.      Ran Test claim via [employer] & medication Pays for a $48.32. max daily dose 3. Qty 90T copay. Will outreach to patient to offer specialty pharmacy services and or release to preferred pharmacy         18

## 2025-01-17 DIAGNOSIS — M48.062 SPINAL STENOSIS, LUMBAR REGION WITH NEUROGENIC CLAUDICATION: ICD-10-CM

## 2025-01-17 DIAGNOSIS — Z01.818 ENCOUNTER FOR OTHER PREPROCEDURAL EXAMINATION: ICD-10-CM

## 2025-01-21 ENCOUNTER — OUTPATIENT (OUTPATIENT)
Dept: OUTPATIENT SERVICES | Facility: HOSPITAL | Age: 75
LOS: 1 days | End: 2025-01-21
Payer: MEDICARE

## 2025-01-21 ENCOUNTER — APPOINTMENT (OUTPATIENT)
Dept: OPHTHALMOLOGY | Facility: CLINIC | Age: 75
End: 2025-01-21
Payer: MEDICARE

## 2025-01-21 DIAGNOSIS — H53.8 OTHER VISUAL DISTURBANCES: ICD-10-CM

## 2025-01-21 PROCEDURE — 99213 OFFICE O/P EST LOW 20 MIN: CPT

## 2025-01-21 PROCEDURE — 99214 OFFICE O/P EST MOD 30 MIN: CPT

## 2025-01-24 ENCOUNTER — RESULT REVIEW (OUTPATIENT)
Age: 75
End: 2025-01-24

## 2025-01-24 ENCOUNTER — OUTPATIENT (OUTPATIENT)
Dept: OUTPATIENT SERVICES | Facility: HOSPITAL | Age: 75
LOS: 1 days | End: 2025-01-24
Payer: MEDICARE

## 2025-01-24 DIAGNOSIS — Z98.890 OTHER SPECIFIED POSTPROCEDURAL STATES: Chronic | ICD-10-CM

## 2025-01-24 DIAGNOSIS — J84.9 INTERSTITIAL PULMONARY DISEASE, UNSPECIFIED: ICD-10-CM

## 2025-01-24 DIAGNOSIS — Z00.8 ENCOUNTER FOR OTHER GENERAL EXAMINATION: ICD-10-CM

## 2025-01-24 PROCEDURE — 71250 CT THORAX DX C-: CPT | Mod: 26

## 2025-01-24 PROCEDURE — 71250 CT THORAX DX C-: CPT

## 2025-01-25 DIAGNOSIS — J84.9 INTERSTITIAL PULMONARY DISEASE, UNSPECIFIED: ICD-10-CM

## 2025-01-29 ENCOUNTER — APPOINTMENT (OUTPATIENT)
Dept: CARDIOLOGY | Facility: CLINIC | Age: 75
End: 2025-01-29
Payer: MEDICARE

## 2025-01-29 VITALS
SYSTOLIC BLOOD PRESSURE: 116 MMHG | HEART RATE: 83 BPM | WEIGHT: 179 LBS | BODY MASS INDEX: 35.14 KG/M2 | HEIGHT: 60 IN | DIASTOLIC BLOOD PRESSURE: 60 MMHG

## 2025-01-29 DIAGNOSIS — E78.00 PURE HYPERCHOLESTEROLEMIA, UNSPECIFIED: ICD-10-CM

## 2025-01-29 DIAGNOSIS — I10 ESSENTIAL (PRIMARY) HYPERTENSION: ICD-10-CM

## 2025-01-29 DIAGNOSIS — M54.12 DISEASE OF SPINAL CORD, UNSPECIFIED: ICD-10-CM

## 2025-01-29 DIAGNOSIS — Z01.818 ENCOUNTER FOR OTHER PREPROCEDURAL EXAMINATION: ICD-10-CM

## 2025-01-29 DIAGNOSIS — G95.9 DISEASE OF SPINAL CORD, UNSPECIFIED: ICD-10-CM

## 2025-01-29 PROCEDURE — 93000 ELECTROCARDIOGRAM COMPLETE: CPT | Mod: NC

## 2025-01-29 PROCEDURE — 99214 OFFICE O/P EST MOD 30 MIN: CPT | Mod: 25

## 2025-02-03 NOTE — ASU PATIENT PROFILE, ADULT - NSICDXPASTMEDICALHX_GEN_ALL_CORE_FT
PAST MEDICAL HISTORY:  Asthma     Diabetes     GERD (gastroesophageal reflux disease)     High cholesterol     Hypertension     Sciatica

## 2025-02-03 NOTE — ASU PATIENT PROFILE, ADULT - FALL HARM RISK - FALLEN IN PAST
Urology called due to finding on Renal/Bladder U/S below.    IMPRESSION:  No hydronephrosis.    Calcification measuring approximately 1.2 cm along the bladder neck/base of the prostate, which could reflect a bladder calcification or calculus, or prostatic calcification.    Layering debris within the urinary bladder.    Calcification could be phlebolith vs. bladder calculus vs. prostatic calcification. Recommend outpatient urology follow up for outpatient cystoscopy and further workup. No acute urologic intervention at this time.    [facility]  [address] [place] [address] [id number] [phone]    Urology  [id number]
Urology cs called
Accidental fall

## 2025-02-03 NOTE — ASU PATIENT PROFILE, ADULT - FALL HARM RISK - HARM RISK INTERVENTIONS
Assistance with ambulation/Assistance OOB with selected safe patient handling equipment/Communicate Risk of Fall with Harm to all staff/Discuss with provider need for PT consult/Monitor gait and stability/Provide patient with walking aids - walker, cane, crutches/Reinforce activity limits and safety measures with patient and family/Sit up slowly, dangle for a short time, stand at bedside before walking/Tailored Fall Risk Interventions/Use of alarms - bed, chair and/or voice tab/Visual Cue: Yellow wristband and red socks/Bed in lowest position, wheels locked, appropriate side rails in place/Call bell, personal items and telephone in reach/Instruct patient to call for assistance before getting out of bed or chair/Non-slip footwear when patient is out of bed/[place] to call system/Physically safe environment - no spills, clutter or unnecessary equipment/Purposeful Proactive Rounding/Room/bathroom lighting operational, light cord in reach

## 2025-02-04 ENCOUNTER — INPATIENT (INPATIENT)
Facility: HOSPITAL | Age: 75
LOS: 8 days | Discharge: NURSING, REHAB OR LONG-TERM CARE | DRG: 402 | End: 2025-02-13
Attending: INTERNAL MEDICINE | Admitting: STUDENT IN AN ORGANIZED HEALTH CARE EDUCATION/TRAINING PROGRAM
Payer: MEDICARE

## 2025-02-04 ENCOUNTER — APPOINTMENT (OUTPATIENT)
Dept: ORTHOPEDIC SURGERY | Facility: HOSPITAL | Age: 75
End: 2025-02-04

## 2025-02-04 VITALS
SYSTOLIC BLOOD PRESSURE: 111 MMHG | DIASTOLIC BLOOD PRESSURE: 53 MMHG | TEMPERATURE: 98 F | HEART RATE: 66 BPM | HEIGHT: 58 IN | OXYGEN SATURATION: 96 % | RESPIRATION RATE: 15 BRPM | WEIGHT: 179.9 LBS

## 2025-02-04 DIAGNOSIS — Z98.890 OTHER SPECIFIED POSTPROCEDURAL STATES: Chronic | ICD-10-CM

## 2025-02-04 DIAGNOSIS — M48.062 SPINAL STENOSIS, LUMBAR REGION WITH NEUROGENIC CLAUDICATION: ICD-10-CM

## 2025-02-04 LAB
ANION GAP SERPL CALC-SCNC: 10 MMOL/L — SIGNIFICANT CHANGE UP (ref 7–14)
BUN SERPL-MCNC: 12 MG/DL — SIGNIFICANT CHANGE UP (ref 10–20)
CALCIUM SERPL-MCNC: 8.5 MG/DL — SIGNIFICANT CHANGE UP (ref 8.4–10.5)
CHLORIDE SERPL-SCNC: 107 MMOL/L — SIGNIFICANT CHANGE UP (ref 98–110)
CO2 SERPL-SCNC: 21 MMOL/L — SIGNIFICANT CHANGE UP (ref 17–32)
CREAT SERPL-MCNC: 0.6 MG/DL — LOW (ref 0.7–1.5)
EGFR: 95 ML/MIN/1.73M2 — SIGNIFICANT CHANGE UP
GLUCOSE BLDC GLUCOMTR-MCNC: 116 MG/DL — HIGH (ref 70–99)
GLUCOSE BLDC GLUCOMTR-MCNC: 141 MG/DL — HIGH (ref 70–99)
GLUCOSE BLDC GLUCOMTR-MCNC: 152 MG/DL — HIGH (ref 70–99)
GLUCOSE BLDC GLUCOMTR-MCNC: 153 MG/DL — HIGH (ref 70–99)
GLUCOSE SERPL-MCNC: 142 MG/DL — HIGH (ref 70–99)
HCT VFR BLD CALC: 31.6 % — LOW (ref 37–47)
HGB BLD-MCNC: 10.3 G/DL — LOW (ref 12–16)
MCHC RBC-ENTMCNC: 28.9 PG — SIGNIFICANT CHANGE UP (ref 27–31)
MCHC RBC-ENTMCNC: 32.6 G/DL — SIGNIFICANT CHANGE UP (ref 32–37)
MCV RBC AUTO: 88.5 FL — SIGNIFICANT CHANGE UP (ref 81–99)
NRBC # BLD: 0 /100 WBCS — SIGNIFICANT CHANGE UP (ref 0–0)
NRBC BLD-RTO: 0 /100 WBCS — SIGNIFICANT CHANGE UP (ref 0–0)
PLATELET # BLD AUTO: 159 K/UL — SIGNIFICANT CHANGE UP (ref 130–400)
PMV BLD: 13.2 FL — HIGH (ref 7.4–10.4)
POTASSIUM SERPL-MCNC: 4.2 MMOL/L — SIGNIFICANT CHANGE UP (ref 3.5–5)
POTASSIUM SERPL-SCNC: 4.2 MMOL/L — SIGNIFICANT CHANGE UP (ref 3.5–5)
RBC # BLD: 3.57 M/UL — LOW (ref 4.2–5.4)
RBC # FLD: 15.1 % — HIGH (ref 11.5–14.5)
SODIUM SERPL-SCNC: 138 MMOL/L — SIGNIFICANT CHANGE UP (ref 135–146)
WBC # BLD: 11.78 K/UL — HIGH (ref 4.8–10.8)
WBC # FLD AUTO: 11.78 K/UL — HIGH (ref 4.8–10.8)

## 2025-02-04 PROCEDURE — C1713: CPT

## 2025-02-04 PROCEDURE — 97162 PT EVAL MOD COMPLEX 30 MIN: CPT | Mod: GP

## 2025-02-04 PROCEDURE — 22633 ARTHRD CMBN 1NTRSPC LUMBAR: CPT

## 2025-02-04 PROCEDURE — 97530 THERAPEUTIC ACTIVITIES: CPT | Mod: GP

## 2025-02-04 PROCEDURE — 85027 COMPLETE CBC AUTOMATED: CPT

## 2025-02-04 PROCEDURE — 97116 GAIT TRAINING THERAPY: CPT | Mod: GP

## 2025-02-04 PROCEDURE — 97166 OT EVAL MOD COMPLEX 45 MIN: CPT | Mod: GO

## 2025-02-04 PROCEDURE — 97535 SELF CARE MNGMENT TRAINING: CPT | Mod: GO

## 2025-02-04 PROCEDURE — 71045 X-RAY EXAM CHEST 1 VIEW: CPT

## 2025-02-04 PROCEDURE — 72020 X-RAY EXAM OF SPINE 1 VIEW: CPT

## 2025-02-04 PROCEDURE — C9399: CPT

## 2025-02-04 PROCEDURE — 36415 COLL VENOUS BLD VENIPUNCTURE: CPT

## 2025-02-04 PROCEDURE — 63052 LAM FACETC/FRMT ARTHRD LUM 1: CPT

## 2025-02-04 PROCEDURE — 22840 INSERT SPINE FIXATION DEVICE: CPT

## 2025-02-04 PROCEDURE — 80048 BASIC METABOLIC PNL TOTAL CA: CPT

## 2025-02-04 PROCEDURE — 82962 GLUCOSE BLOOD TEST: CPT

## 2025-02-04 PROCEDURE — C1889: CPT

## 2025-02-04 PROCEDURE — 97110 THERAPEUTIC EXERCISES: CPT | Mod: GP

## 2025-02-04 PROCEDURE — 61783 SCAN PROC SPINAL: CPT | Mod: 59

## 2025-02-04 PROCEDURE — 22853 INSJ BIOMECHANICAL DEVICE: CPT

## 2025-02-04 RX ORDER — APREPITANT 40 MG/1
40 CAPSULE ORAL ONCE
Refills: 0 | Status: COMPLETED | OUTPATIENT
Start: 2025-02-04 | End: 2025-02-04

## 2025-02-04 RX ORDER — OXYCODONE HYDROCHLORIDE 30 MG/1
5 TABLET ORAL EVERY 6 HOURS
Refills: 0 | Status: DISCONTINUED | OUTPATIENT
Start: 2025-02-04 | End: 2025-02-11

## 2025-02-04 RX ORDER — ATORVASTATIN CALCIUM 40 MG/1
1 TABLET, FILM COATED ORAL
Refills: 0 | DISCHARGE

## 2025-02-04 RX ORDER — FUROSEMIDE 20 MG
1 TABLET ORAL
Refills: 0 | DISCHARGE

## 2025-02-04 RX ORDER — LEVOTHYROXINE SODIUM 25 UG/1
75 TABLET ORAL DAILY
Refills: 0 | Status: DISCONTINUED | OUTPATIENT
Start: 2025-02-04 | End: 2025-02-13

## 2025-02-04 RX ORDER — GLUCAGON 3 MG/1
1 POWDER NASAL ONCE
Refills: 0 | Status: DISCONTINUED | OUTPATIENT
Start: 2025-02-04 | End: 2025-02-13

## 2025-02-04 RX ORDER — INSULIN LISPRO 100/ML
VIAL (ML) SUBCUTANEOUS
Refills: 0 | Status: DISCONTINUED | OUTPATIENT
Start: 2025-02-04 | End: 2025-02-13

## 2025-02-04 RX ORDER — SODIUM CHLORIDE 9 G/ML
1000 INJECTION, SOLUTION INTRAVENOUS
Refills: 0 | Status: DISCONTINUED | OUTPATIENT
Start: 2025-02-04 | End: 2025-02-13

## 2025-02-04 RX ORDER — DM/PSEUDOEPHED/ACETAMINOPHEN 10-30-250
12.5 CAPSULE ORAL ONCE
Refills: 0 | Status: DISCONTINUED | OUTPATIENT
Start: 2025-02-04 | End: 2025-02-13

## 2025-02-04 RX ORDER — ESCITALOPRAM OXALATE 10 MG/1
1 TABLET ORAL
Refills: 0 | DISCHARGE

## 2025-02-04 RX ORDER — OXYCODONE HYDROCHLORIDE AND ACETAMINOPHEN 5; 325 MG/1; MG/1
1 TABLET ORAL ONCE
Refills: 0 | Status: DISCONTINUED | OUTPATIENT
Start: 2025-02-04 | End: 2025-02-04

## 2025-02-04 RX ORDER — BUSPIRONE HYDROCHLORIDE 15 MG/1
1 TABLET ORAL
Refills: 0 | DISCHARGE

## 2025-02-04 RX ORDER — KETOROLAC TROMETHAMINE 10 MG
15 TABLET ORAL EVERY 8 HOURS
Refills: 0 | Status: DISCONTINUED | OUTPATIENT
Start: 2025-02-04 | End: 2025-02-05

## 2025-02-04 RX ORDER — LINACLOTIDE 72 UG/1
1 CAPSULE, GELATIN COATED ORAL
Refills: 0 | DISCHARGE

## 2025-02-04 RX ORDER — BENAZEPRIL HYDROCHLORIDE 10 MG/1
1 TABLET ORAL
Refills: 0 | DISCHARGE

## 2025-02-04 RX ORDER — DIPHENHYDRAMINE HCL 25 MG
25 CAPSULE ORAL EVERY 6 HOURS
Refills: 0 | Status: DISCONTINUED | OUTPATIENT
Start: 2025-02-04 | End: 2025-02-13

## 2025-02-04 RX ORDER — SENNOSIDES 8.6 MG
2 TABLET ORAL AT BEDTIME
Refills: 0 | Status: DISCONTINUED | OUTPATIENT
Start: 2025-02-04 | End: 2025-02-13

## 2025-02-04 RX ORDER — DM/PSEUDOEPHED/ACETAMINOPHEN 10-30-250
15 CAPSULE ORAL ONCE
Refills: 0 | Status: DISCONTINUED | OUTPATIENT
Start: 2025-02-04 | End: 2025-02-13

## 2025-02-04 RX ORDER — ENOXAPARIN SODIUM 100 MG/ML
40 INJECTION SUBCUTANEOUS EVERY 24 HOURS
Refills: 0 | Status: DISCONTINUED | OUTPATIENT
Start: 2025-02-05 | End: 2025-02-13

## 2025-02-04 RX ORDER — ALBUTEROL SULFATE 90 UG/1
2 INHALANT RESPIRATORY (INHALATION)
Refills: 0 | DISCHARGE

## 2025-02-04 RX ORDER — ACETAMINOPHEN, DIPHENHYDRAMINE HCL, PHENYLEPHRINE HCL 325; 25; 5 MG/1; MG/1; MG/1
5 TABLET ORAL AT BEDTIME
Refills: 0 | Status: DISCONTINUED | OUTPATIENT
Start: 2025-02-04 | End: 2025-02-13

## 2025-02-04 RX ORDER — MIRTAZAPINE 7.5 MG/1
1 TABLET, FILM COATED ORAL
Refills: 0 | DISCHARGE

## 2025-02-04 RX ORDER — OXYCODONE HYDROCHLORIDE 30 MG/1
10 TABLET ORAL EVERY 6 HOURS
Refills: 0 | Status: DISCONTINUED | OUTPATIENT
Start: 2025-02-04 | End: 2025-02-11

## 2025-02-04 RX ORDER — ATORVASTATIN CALCIUM 80 MG/1
20 TABLET, FILM COATED ORAL AT BEDTIME
Refills: 0 | Status: DISCONTINUED | OUTPATIENT
Start: 2025-02-04 | End: 2025-02-13

## 2025-02-04 RX ORDER — MIRTAZAPINE 30 MG/1
15 TABLET, FILM COATED ORAL AT BEDTIME
Refills: 0 | Status: DISCONTINUED | OUTPATIENT
Start: 2025-02-04 | End: 2025-02-13

## 2025-02-04 RX ORDER — POLYETHYLENE GLYCOL 3350 17 G/17G
17 POWDER, FOR SOLUTION ORAL DAILY
Refills: 0 | Status: DISCONTINUED | OUTPATIENT
Start: 2025-02-04 | End: 2025-02-06

## 2025-02-04 RX ORDER — INSULIN LISPRO 100/ML
VIAL (ML) SUBCUTANEOUS AT BEDTIME
Refills: 0 | Status: DISCONTINUED | OUTPATIENT
Start: 2025-02-04 | End: 2025-02-13

## 2025-02-04 RX ORDER — SODIUM CHLORIDE 9 G/ML
1000 INJECTION, SOLUTION INTRAVENOUS
Refills: 0 | Status: DISCONTINUED | OUTPATIENT
Start: 2025-02-04 | End: 2025-02-04

## 2025-02-04 RX ORDER — DM/PSEUDOEPHED/ACETAMINOPHEN 10-30-250
25 CAPSULE ORAL ONCE
Refills: 0 | Status: DISCONTINUED | OUTPATIENT
Start: 2025-02-04 | End: 2025-02-13

## 2025-02-04 RX ORDER — ORAL SEMAGLUTIDE 3 MG/1
1 TABLET ORAL
Refills: 0 | DISCHARGE

## 2025-02-04 RX ORDER — HYDROMORPHONE HYDROCHLORIDE 4 MG/ML
30 INJECTION, SOLUTION INTRAMUSCULAR; INTRAVENOUS; SUBCUTANEOUS
Refills: 0 | Status: DISCONTINUED | OUTPATIENT
Start: 2025-02-04 | End: 2025-02-05

## 2025-02-04 RX ORDER — ESCITALOPRAM 10 MG/1
20 TABLET, FILM COATED ORAL DAILY
Refills: 0 | Status: DISCONTINUED | OUTPATIENT
Start: 2025-02-04 | End: 2025-02-13

## 2025-02-04 RX ORDER — TRAMADOL HYDROCHLORIDE 100 MG/1
50 TABLET, EXTENDED RELEASE ORAL EVERY 6 HOURS
Refills: 0 | Status: DISCONTINUED | OUTPATIENT
Start: 2025-02-04 | End: 2025-02-04

## 2025-02-04 RX ORDER — TIMOLOL MALEATE 5 MG/ML
1 SOLUTION OPHTHALMIC
Refills: 0 | Status: DISCONTINUED | OUTPATIENT
Start: 2025-02-04 | End: 2025-02-13

## 2025-02-04 RX ORDER — PANTOPRAZOLE 20 MG/1
40 TABLET, DELAYED RELEASE ORAL
Refills: 0 | Status: DISCONTINUED | OUTPATIENT
Start: 2025-02-04 | End: 2025-02-13

## 2025-02-04 RX ORDER — ONDANSETRON 4 MG/1
4 TABLET, ORALLY DISINTEGRATING ORAL EVERY 4 HOURS
Refills: 0 | Status: DISCONTINUED | OUTPATIENT
Start: 2025-02-04 | End: 2025-02-13

## 2025-02-04 RX ORDER — OMEPRAZOLE MAGNESIUM 20 MG/1
1 CAPSULE, DELAYED RELEASE ORAL
Refills: 0 | DISCHARGE

## 2025-02-04 RX ORDER — ALBUTEROL 90 MCG
1 AEROSOL REFILL (GRAM) INHALATION
Refills: 0 | Status: DISCONTINUED | OUTPATIENT
Start: 2025-02-04 | End: 2025-02-13

## 2025-02-04 RX ORDER — CEFAZOLIN SODIUM IN 0.9 % NACL 2 G/10 ML
2000 SYRINGE (ML) INTRAVENOUS EVERY 8 HOURS
Refills: 0 | Status: COMPLETED | OUTPATIENT
Start: 2025-02-04 | End: 2025-02-05

## 2025-02-04 RX ORDER — ACETAMINOPHEN 160 MG/5ML
1000 SUSPENSION ORAL ONCE
Refills: 0 | Status: COMPLETED | OUTPATIENT
Start: 2025-02-04 | End: 2025-02-04

## 2025-02-04 RX ORDER — DILTIAZEM HYDROCHLORIDE 300 MG/1
1 CAPSULE, COATED, EXTENDED RELEASE ORAL
Refills: 0 | DISCHARGE

## 2025-02-04 RX ORDER — METOCLOPRAMIDE 10 MG/1
10 TABLET ORAL ONCE
Refills: 0 | Status: DISCONTINUED | OUTPATIENT
Start: 2025-02-04 | End: 2025-02-04

## 2025-02-04 RX ORDER — FLUTICASONE PROPIONATE 44 MCG
1 AEROSOL WITH ADAPTER (GRAM) INHALATION
Refills: 0 | DISCHARGE

## 2025-02-04 RX ORDER — BUSPIRONE HYDROCHLORIDE 5 MG/1
10 TABLET ORAL
Refills: 0 | Status: DISCONTINUED | OUTPATIENT
Start: 2025-02-04 | End: 2025-02-07

## 2025-02-04 RX ORDER — HYDROMORPHONE HYDROCHLORIDE 4 MG/ML
0.5 INJECTION, SOLUTION INTRAMUSCULAR; INTRAVENOUS; SUBCUTANEOUS
Refills: 0 | Status: DISCONTINUED | OUTPATIENT
Start: 2025-02-04 | End: 2025-02-04

## 2025-02-04 RX ORDER — ONDANSETRON 4 MG/1
4 TABLET, ORALLY DISINTEGRATING ORAL ONCE
Refills: 0 | Status: DISCONTINUED | OUTPATIENT
Start: 2025-02-04 | End: 2025-02-04

## 2025-02-04 RX ORDER — LEVOTHYROXINE SODIUM 175 UG/1
1 TABLET ORAL
Refills: 0 | DISCHARGE

## 2025-02-04 RX ORDER — ASPIRIN 81 MG
0 TABLET, DELAYED RELEASE (ENTERIC COATED) ORAL
Refills: 0 | DISCHARGE

## 2025-02-04 RX ORDER — CELECOXIB 200 MG
200 CAPSULE ORAL ONCE
Refills: 0 | Status: COMPLETED | OUTPATIENT
Start: 2025-02-04 | End: 2025-02-04

## 2025-02-04 RX ORDER — DILTIAZEM HYDROCHLORIDE 60 MG/1
120 TABLET ORAL DAILY
Refills: 0 | Status: DISCONTINUED | OUTPATIENT
Start: 2025-02-04 | End: 2025-02-13

## 2025-02-04 RX ORDER — PROPRANOLOL 80 MG/1
1 CAPSULE, EXTENDED RELEASE ORAL
Refills: 0 | DISCHARGE

## 2025-02-04 RX ORDER — MEMANTINE HYDROCHLORIDE 14 MG/1
1 CAPSULE, EXTENDED RELEASE ORAL
Refills: 0 | DISCHARGE

## 2025-02-04 RX ORDER — DAPAGLIFLOZIN 5 MG/1
1 TABLET, FILM COATED ORAL
Refills: 0 | DISCHARGE

## 2025-02-04 RX ORDER — ACETAMINOPHEN 160 MG/5ML
1000 SUSPENSION ORAL ONCE
Refills: 0 | Status: DISCONTINUED | OUTPATIENT
Start: 2025-02-04 | End: 2025-02-04

## 2025-02-04 RX ADMIN — TIMOLOL MALEATE 1 DROP(S): 5 SOLUTION OPHTHALMIC at 18:14

## 2025-02-04 RX ADMIN — Medication 15 MILLIGRAM(S): at 22:05

## 2025-02-04 RX ADMIN — ACETAMINOPHEN 1000 MILLIGRAM(S): 160 SUSPENSION ORAL at 07:36

## 2025-02-04 RX ADMIN — Medication 100 MILLIGRAM(S): at 22:06

## 2025-02-04 RX ADMIN — Medication 200 MILLIGRAM(S): at 07:35

## 2025-02-04 RX ADMIN — Medication 200 MILLIGRAM(S): at 07:36

## 2025-02-04 RX ADMIN — ATORVASTATIN CALCIUM 20 MILLIGRAM(S): 80 TABLET, FILM COATED ORAL at 22:04

## 2025-02-04 RX ADMIN — MIRTAZAPINE 15 MILLIGRAM(S): 30 TABLET, FILM COATED ORAL at 22:05

## 2025-02-04 RX ADMIN — BUSPIRONE HYDROCHLORIDE 10 MILLIGRAM(S): 5 TABLET ORAL at 18:14

## 2025-02-04 RX ADMIN — ACETAMINOPHEN 1000 MILLIGRAM(S): 160 SUSPENSION ORAL at 07:34

## 2025-02-04 RX ADMIN — Medication 2 TABLET(S): at 22:04

## 2025-02-04 RX ADMIN — Medication 0: at 22:05

## 2025-02-04 RX ADMIN — Medication 1: at 17:30

## 2025-02-04 RX ADMIN — APREPITANT 40 MILLIGRAM(S): 40 CAPSULE ORAL at 07:35

## 2025-02-04 RX ADMIN — HYDROMORPHONE HYDROCHLORIDE 30 MILLILITER(S): 4 INJECTION, SOLUTION INTRAMUSCULAR; INTRAVENOUS; SUBCUTANEOUS at 16:14

## 2025-02-04 RX ADMIN — HYDROMORPHONE HYDROCHLORIDE 0.5 MILLIGRAM(S): 4 INJECTION, SOLUTION INTRAMUSCULAR; INTRAVENOUS; SUBCUTANEOUS at 15:15

## 2025-02-04 NOTE — ASU PREOP CHECKLIST - VIA
Alin is a 26 month old female, complaining of right hand finger laceration on 1st and 3rd fingers that happened last night. Mom reports she \"got into\" the drawer with the veggie slicer and cut herself. Site is clean, dry, intact and open to air.     Remedies attempted by patient include: hydrogen peroxide cleaned immediately, Neosporin, Tylenol    Visit Vitals  Pulse 105   Temp 97.9 °F (36.6 °C) (Oral)   Resp 20   Ht 3' 2\" (0.965 m)   Wt 12.8 kg   SpO2 100%   BMI 13.73 kg/m²     ?  Patient would like communication of their results via:     Cell Phone:   Telephone Information:   Mobile [phone]     Okay to leave a message containing results? Yes    Patient confirmed latex sensitivity.  Medical history verified with patient.   Medications verified and reviewed. Pharmacy verified and reviewed.   Allergies verified and reviewed.  Tobacco history verified on 10/19/2019.    PCP: see banner.    stretcher

## 2025-02-04 NOTE — PATIENT PROFILE ADULT - FALL HARM RISK - HARM RISK INTERVENTIONS

## 2025-02-04 NOTE — PRE-ANESTHESIA EVALUATION ADULT - NSANTHADDINFOFT_GEN_ALL_CORE
Discussed risks and benefits of anesthesia including but not limited to the risk of sore throat, N/V, damage to mouth, teeth and lips, stroke, MI and even death.  Patient demonstrates understanding, all questions answered to patients satisfaction. The patient wishes to proceed with planned treatment.

## 2025-02-04 NOTE — PATIENT PROFILE ADULT - FALL HARM RISK - FACTORS
Ton Mckeon jordy [address]  [address] [facility], [address] [place] [address]  [phone]      Medical Progress Note      NAME: Hakeem Daniels   :  1960  MRM:  [id number]    Date/Time: 2021  12:42 PM         Subjective:     Chief Complaint: \"I want to go home\"     Pt very upset that she can't go home. Explained that even on 6L with exertion sat's dropping to the low 80's and not recovering readily. She was very angry and threatened to leave AMA. Explained that while that is her right it would not be advised due to risk of decompensation and death without O2 and the appropriate medications     ROS:  (bold if positive, if negative)      SOB improving   Weakness resolved        Objective:       Vitals:          Last 24hrs VS reviewed since prior progress note.  Most recent are:    Visit Vitals  BP (!) 104/57 (BP 1 Location: Left upper arm, BP Patient Position: At rest)   Pulse 73   Temp 97.8 °F (36.6 °C)   Resp 18   Ht 5' 9\" (1.753 m)   Wt 83.9 kg (185 lb)   SpO2 96%   BMI 27.32 kg/m²     SpO2 Readings from Last 6 Encounters:   21 96%   14 98%   13 98%   12 98%    O2 Flow Rate (L/min): 7 l/min       Intake/Output Summary (Last 24 hours) at 2021 1754  Last data filed at 2021 6642  Gross per 24 hour   Intake 160 ml   Output 600 ml   Net -440 ml          Exam:     Physical Exam:    Gen:  Well-developed, well-nourished, in no acute distress  HEENT:  Pink conjunctivae, PERRL, hearing intact to voice, moist mucous membranes  Neck:  Supple, without masses, thyroid non-tender  Resp: diffuse crackles   Card:  No murmurs, normal S1, S2 without thrills, bruits or peripheral edema  Abd:  Soft, non-tender, non-distended, normoactive bowel sounds are present  Musc:  No cyanosis or clubbing  Skin:  No rashes or ulcers, skin turgor is good  Neuro:  Cranial nerves 3-12 are grossly intact,  strength is 5/5 bilaterally and dorsi / plantarflexion is 5/5 bilaterally, follows commands appropriately  Psych:  Good insight, oriented to person, place and time, alert    Medications Reviewed: (see below)    Lab Data Reviewed: (see below)    ______________________________________________________________________    Medications:     Current Facility-Administered Medications   Medication Dose Route Frequency    dexamethasone (DECADRON) 4 mg/mL injection 10 mg  10 mg IntraVENous Q12H    furosemide (LASIX) tablet 20 mg  20 mg Oral DAILY    ALPRAZolam (XANAX) tablet 0.25 mg  0.25 mg Oral QID PRN    amoxicillin-clavulanate (AUGMENTIN) 875-125 mg per tablet 1 Tablet  1 Tablet Oral Q12H    ipratropium-albuterol (COMBIVENT RESPIMAT) 20 mcg-100 mcg inhalation spray  1 Puff Inhalation Q6H RT    sodium chloride (NS) flush 5-10 mL  5-10 mL IntraVENous PRN    sodium chloride (NS) flush 5-40 mL  5-40 mL IntraVENous Q8H    sodium chloride (NS) flush 5-40 mL  5-40 mL IntraVENous PRN    acetaminophen (TYLENOL) tablet 650 mg  650 mg Oral Q6H PRN    Or    acetaminophen (TYLENOL) suppository 650 mg  650 mg Rectal Q6H PRN    polyethylene glycol (MIRALAX) packet 17 g  17 g Oral DAILY PRN    promethazine (PHENERGAN) tablet 12.5 mg  12.5 mg Oral Q6H PRN    Or    ondansetron (ZOFRAN) injection 4 mg  4 mg IntraVENous Q6H PRN    enoxaparin (LOVENOX) injection 30 mg  30 mg SubCUTAneous Q12H    guaiFENesin-dextromethorphan (ROBITUSSIN DM) 100-10 mg/5 mL syrup 5 mL  5 mL Oral Q4H PRN    ascorbic acid (vitamin C) (VITAMIN C) tablet 500 mg  500 mg Oral BID    zinc sulfate (ZINCATE) 50 mg zinc (220 mg) capsule 1 Capsule  1 Capsule Oral DAILY    azithromycin (ZITHROMAX) 500 mg in 0.9% sodium chloride 250 mL (VIAL-MATE)  500 mg IntraVENous Q24H    benzonatate (TESSALON) capsule 100 mg  100 mg Oral TID PRN    baricitinib (OLUMIANT) tablet 4 mg  4 mg Oral DAILY    zonisamide (ZONEGRAN) capsule 300 mg  300 mg Oral DAILY    levothyroxine (SYNTHROID) tablet 112 mcg  112 mcg Oral 6am    amitriptyline (ELAVIL) tablet 30 mg  30 mg Oral QHS            Lab Review:     Recent Labs     12/30/21  0918 12/29/21  0038 12/28/21  0840   WBC 13.1* 14.3* 10.6   HGB 14.9 14.3 14.0   HCT 42.6 41.6 41.1   * 402* 364     Recent Labs     12/30/21  0918 12/29/21  0038 12/28/21  0840    142 141   K 4.0 4.2 3.9    112* 110*   CO2 24 20* 21   * 147* 193*   BUN 18 22* 20   CREA 0.83 0.73 0.78   CA 8.8 8.5 8.4*   MG 2.2  --   --    ALB 2.7*  --   --    ALT 40  --   --      No components found for: [place]         Assessment / Plan:   Acute respiratory failure with hypoxia - unvaccinated. High risk for decompensation because of age, lack of vaccination status, CXR findings/degree of inflammatory marker elevation. PCP started her on ivermectin which has NOT been clinically proven to provide any benefit in covid and it is unclear that it may not even lead to harm as not routinely used in humans. STOP Ivermectin   -continue IV decadron and baricitinib   -vitamin C/zinc   -procalcitonin remains low; complete azithro  -d-dimer WNL; Lovenox for DVT prophylaxis   -incentive spirometry, OOB, prone; pt voice understanding   -despite the fact d-dimer and CRP downtrending, her oxygenation/improvement in hypoxia is lagging and pt has become quite anxious/adamant about leaving.  Pt has been advised of risks and will not be able to obtain O2 if leaves AMA which could lead to worsening respiratory status and even death   -lasix added  -pulm on board       Seizure disorder ([facility] [address] (4/18/2013)  -continue outpt Zonegran        Hypothyroidism (4/18/2013)  -on levothyroxine    Total time spent with patient: [address] discussed with: Patient and Nursing Staff, Risk management, CM, Charge RN     Discussed:  Care Plan    Prophylaxis:  Lovenox    Disposition:  Home w/Family           ___________________________________________________    Attending Physician: Iglesia Rodriguez MD Impaired gait/IV and/or equipment tethered to patient/Post procedure

## 2025-02-05 LAB
GLUCOSE BLDC GLUCOMTR-MCNC: 117 MG/DL — HIGH (ref 70–99)
GLUCOSE BLDC GLUCOMTR-MCNC: 118 MG/DL — HIGH (ref 70–99)
GLUCOSE BLDC GLUCOMTR-MCNC: 120 MG/DL — HIGH (ref 70–99)
GLUCOSE BLDC GLUCOMTR-MCNC: 122 MG/DL — HIGH (ref 70–99)

## 2025-02-05 RX ORDER — BACTERIOSTATIC SODIUM CHLORIDE 0.9 %
500 VIAL (ML) INJECTION ONCE
Refills: 0 | Status: COMPLETED | OUTPATIENT
Start: 2025-02-05 | End: 2025-02-05

## 2025-02-05 RX ADMIN — ENOXAPARIN SODIUM 40 MILLIGRAM(S): 100 INJECTION SUBCUTANEOUS at 12:15

## 2025-02-05 RX ADMIN — Medication 1000 MILLILITER(S): at 15:07

## 2025-02-05 RX ADMIN — DILTIAZEM HYDROCHLORIDE 120 MILLIGRAM(S): 60 TABLET ORAL at 05:05

## 2025-02-05 RX ADMIN — Medication 15 MILLIGRAM(S): at 14:04

## 2025-02-05 RX ADMIN — Medication 15 MILLIGRAM(S): at 17:48

## 2025-02-05 RX ADMIN — TIMOLOL MALEATE 1 DROP(S): 5 SOLUTION OPHTHALMIC at 05:26

## 2025-02-05 RX ADMIN — BUSPIRONE HYDROCHLORIDE 10 MILLIGRAM(S): 5 TABLET ORAL at 17:50

## 2025-02-05 RX ADMIN — Medication 1000 MILLILITER(S): at 12:15

## 2025-02-05 RX ADMIN — ATORVASTATIN CALCIUM 20 MILLIGRAM(S): 80 TABLET, FILM COATED ORAL at 22:01

## 2025-02-05 RX ADMIN — Medication 100 MILLIGRAM(S): at 14:08

## 2025-02-05 RX ADMIN — Medication 100 MILLIGRAM(S): at 05:07

## 2025-02-05 RX ADMIN — ESCITALOPRAM 20 MILLIGRAM(S): 10 TABLET, FILM COATED ORAL at 12:16

## 2025-02-05 RX ADMIN — BUSPIRONE HYDROCHLORIDE 10 MILLIGRAM(S): 5 TABLET ORAL at 05:06

## 2025-02-05 RX ADMIN — POLYETHYLENE GLYCOL 3350 17 GRAM(S): 17 POWDER, FOR SOLUTION ORAL at 12:15

## 2025-02-05 RX ADMIN — LEVOTHYROXINE SODIUM 75 MICROGRAM(S): 25 TABLET ORAL at 05:06

## 2025-02-05 RX ADMIN — MIRTAZAPINE 15 MILLIGRAM(S): 30 TABLET, FILM COATED ORAL at 22:01

## 2025-02-05 RX ADMIN — TIMOLOL MALEATE 1 DROP(S): 5 SOLUTION OPHTHALMIC at 17:51

## 2025-02-05 RX ADMIN — PANTOPRAZOLE 40 MILLIGRAM(S): 20 TABLET, DELAYED RELEASE ORAL at 07:55

## 2025-02-05 RX ADMIN — Medication 15 MILLIGRAM(S): at 05:07

## 2025-02-05 NOTE — PHYSICAL THERAPY INITIAL EVALUATION ADULT - AMBULATION SKILLS, REHAB EVAL
ATTENDING STATEMENT for exam on: 3/26     Patient is an ex- Gestational Age  40.4 (24 Mar 2019 17:19)   week Male now 2d.   Overnight: no acute events overnight reported, working on feeding  s/p circ    [x ] voiding and stooling appropriately  Vital Signs Last 24 Hrs  T(C): 36.7 (26 Mar 2019 07:35), Max: 36.7 (25 Mar 2019 20:47)  T(F): 98 (26 Mar 2019 07:35), Max: 98 (25 Mar 2019 20:47)  HR: 120 (26 Mar 2019 07:35) (120 - 134)  BP: --  BP(mean): --  RR: 48 (26 Mar 2019 07:35) (44 - 48)  SpO2: -- Daily     Daily Weight Gm: 3729 (25 Mar 2019 21:00)  Current Weight Gm 3729 (19 @ 21:00)    Weight Change Percentage: -4.65 (19 @ 21:00)      Physical Exam:   GEN: nad  HEENT: mmm, afof  Chest: nml s1/s2, RRR, no murmurs appreciated, LCTA b/l  Abd: s/nt/nd, normoactive bowel sounds, no HSM appreciated, umbilicus c/d/i  : external genitalia wnl, s/p circ  Skin: punctate scalp lesion healing  Neuro: +grasp / suck / chayo, tone wnl  Hips: negative ortolani and see    Bilirubin, If applicable:   Bilirubin Total, Serum: 6.4 mg/dL ( @ 21:30)    Glucose, If applicable: CAPILLARY BLOOD GLUCOSE          A/P 2d Male .   If applicable, active issues include:   Single liveborn, born in hospital, delivered by  delivery  Handoff  Term birth of male   Term birth of male   SINGLE LIVEBORN INFANT, DELIVE    - plan for feeding support  - discharge planning and  care education for family  [ ] glucose monitoring, per guideline  [ ] q4h sign monitoring for chorio/gbs/other per guideline  [ ] celine positive or elevated umbilical cord blirubin, serial bilirubin levels +/- hematocrit/reticulocyte count  [ ] breech presentation of  - ultrasound at 4-6 weeks of age  [x] circumcision care  [ ] late  infant, car seat challenge and other  precautions    Anticipated Discharge Date:  [ ] Reviewed lab results and/or Radiology  [ ] Spoke with consultant and/or Social Work  [x] Spoke with family about feeding plan and/or other aspects of  care    [ x] time spent on encounter and associated coordination of care: > 35 minutes    Andreea Martinez MD  Pediatric Hospitalist
SC/RW/needs device

## 2025-02-05 NOTE — OCCUPATIONAL THERAPY INITIAL EVALUATION ADULT - GENERAL OBSERVATIONS, REHAB EVAL
Pt encountered reclined in bed, + IV,+ prima fit.Pt agreeable to bedside OT assessment, may be seen as confirmed with RN. Pt returned to bedside chair, + IV, + LSO, + chair alarm

## 2025-02-05 NOTE — OCCUPATIONAL THERAPY INITIAL EVALUATION ADULT - ADDITIONAL COMMENTS
pt endorses resides alone in 1st floor apartment, + bath tub with grab bar, uses cane inside, walker outside. Pt reports multiple recent falls with injury and at times unable to get out of bed would need to call son at work to get her up

## 2025-02-05 NOTE — OCCUPATIONAL THERAPY INITIAL EVALUATION ADULT - SIT-TO-STAND BALANCE
[id number] - Patient wants DNR removed from Chart     Dr. Corley has patient Lilia Lopes [[id number]] Requesting for her DNR or \"Plan of Care\" to be removed out of her chart as soon as possible. Patient no longer wants to have a DNR on file and has changed her mind she wants to remove her Plane of Care or what was perviously known as an Advance Directive immediately from her chart.    Sent to HIM chart correction.    fair plus

## 2025-02-05 NOTE — OCCUPATIONAL THERAPY INITIAL EVALUATION ADULT - TRANSFER TRAINING, PT EVAL
pt will perform sit<>stand, bed<>chair and toilet transfers with CG A by discharge using most appropriate AD

## 2025-02-06 LAB
ANION GAP SERPL CALC-SCNC: 10 MMOL/L — SIGNIFICANT CHANGE UP (ref 7–14)
BUN SERPL-MCNC: 14 MG/DL — SIGNIFICANT CHANGE UP (ref 10–20)
CALCIUM SERPL-MCNC: 8.4 MG/DL — SIGNIFICANT CHANGE UP (ref 8.4–10.4)
CHLORIDE SERPL-SCNC: 110 MMOL/L — SIGNIFICANT CHANGE UP (ref 98–110)
CO2 SERPL-SCNC: 22 MMOL/L — SIGNIFICANT CHANGE UP (ref 17–32)
CREAT SERPL-MCNC: 0.7 MG/DL — SIGNIFICANT CHANGE UP (ref 0.7–1.5)
EGFR: 91 ML/MIN/1.73M2 — SIGNIFICANT CHANGE UP
GLUCOSE BLDC GLUCOMTR-MCNC: 111 MG/DL — HIGH (ref 70–99)
GLUCOSE BLDC GLUCOMTR-MCNC: 115 MG/DL — HIGH (ref 70–99)
GLUCOSE BLDC GLUCOMTR-MCNC: 130 MG/DL — HIGH (ref 70–99)
GLUCOSE BLDC GLUCOMTR-MCNC: 145 MG/DL — HIGH (ref 70–99)
GLUCOSE SERPL-MCNC: 111 MG/DL — HIGH (ref 70–99)
HCT VFR BLD CALC: 25.6 % — LOW (ref 37–47)
HGB BLD-MCNC: 8.3 G/DL — LOW (ref 12–16)
MCHC RBC-ENTMCNC: 29.2 PG — SIGNIFICANT CHANGE UP (ref 27–31)
MCHC RBC-ENTMCNC: 32.4 G/DL — SIGNIFICANT CHANGE UP (ref 32–37)
MCV RBC AUTO: 90.1 FL — SIGNIFICANT CHANGE UP (ref 81–99)
NRBC # BLD: 0 /100 WBCS — SIGNIFICANT CHANGE UP (ref 0–0)
NRBC BLD-RTO: 0 /100 WBCS — SIGNIFICANT CHANGE UP (ref 0–0)
PLATELET # BLD AUTO: 134 K/UL — SIGNIFICANT CHANGE UP (ref 130–400)
PMV BLD: 13.8 FL — HIGH (ref 7.4–10.4)
POTASSIUM SERPL-MCNC: 4.3 MMOL/L — SIGNIFICANT CHANGE UP (ref 3.5–5)
POTASSIUM SERPL-SCNC: 4.3 MMOL/L — SIGNIFICANT CHANGE UP (ref 3.5–5)
RBC # BLD: 2.84 M/UL — LOW (ref 4.2–5.4)
RBC # FLD: 15.7 % — HIGH (ref 11.5–14.5)
SODIUM SERPL-SCNC: 142 MMOL/L — SIGNIFICANT CHANGE UP (ref 135–146)
WBC # BLD: 10.25 K/UL — SIGNIFICANT CHANGE UP (ref 4.8–10.8)
WBC # FLD AUTO: 10.25 K/UL — SIGNIFICANT CHANGE UP (ref 4.8–10.8)

## 2025-02-06 PROCEDURE — 99232 SBSQ HOSP IP/OBS MODERATE 35: CPT

## 2025-02-06 RX ADMIN — PANTOPRAZOLE 40 MILLIGRAM(S): 20 TABLET, DELAYED RELEASE ORAL at 05:46

## 2025-02-06 RX ADMIN — TIMOLOL MALEATE 1 DROP(S): 5 SOLUTION OPHTHALMIC at 05:46

## 2025-02-06 RX ADMIN — OXYCODONE HYDROCHLORIDE 10 MILLIGRAM(S): 30 TABLET ORAL at 09:36

## 2025-02-06 RX ADMIN — Medication 0: at 21:28

## 2025-02-06 RX ADMIN — ATORVASTATIN CALCIUM 20 MILLIGRAM(S): 80 TABLET, FILM COATED ORAL at 21:27

## 2025-02-06 RX ADMIN — ESCITALOPRAM 20 MILLIGRAM(S): 10 TABLET, FILM COATED ORAL at 11:37

## 2025-02-06 RX ADMIN — TIMOLOL MALEATE 1 DROP(S): 5 SOLUTION OPHTHALMIC at 17:26

## 2025-02-06 RX ADMIN — MIRTAZAPINE 15 MILLIGRAM(S): 30 TABLET, FILM COATED ORAL at 21:27

## 2025-02-06 RX ADMIN — OXYCODONE HYDROCHLORIDE 5 MILLIGRAM(S): 30 TABLET ORAL at 21:27

## 2025-02-06 RX ADMIN — ENOXAPARIN SODIUM 40 MILLIGRAM(S): 100 INJECTION SUBCUTANEOUS at 11:38

## 2025-02-06 RX ADMIN — OXYCODONE HYDROCHLORIDE 10 MILLIGRAM(S): 30 TABLET ORAL at 10:06

## 2025-02-06 RX ADMIN — BUSPIRONE HYDROCHLORIDE 10 MILLIGRAM(S): 5 TABLET ORAL at 17:26

## 2025-02-06 RX ADMIN — BUSPIRONE HYDROCHLORIDE 10 MILLIGRAM(S): 5 TABLET ORAL at 05:46

## 2025-02-06 RX ADMIN — OXYCODONE HYDROCHLORIDE 5 MILLIGRAM(S): 30 TABLET ORAL at 21:57

## 2025-02-06 RX ADMIN — Medication 2 TABLET(S): at 21:28

## 2025-02-06 RX ADMIN — DILTIAZEM HYDROCHLORIDE 120 MILLIGRAM(S): 60 TABLET ORAL at 05:46

## 2025-02-06 RX ADMIN — LEVOTHYROXINE SODIUM 75 MICROGRAM(S): 25 TABLET ORAL at 05:46

## 2025-02-06 NOTE — PROGRESS NOTE ADULT - SUBJECTIVE AND OBJECTIVE BOX
ORTHOPAEDIC SURGERY PROGRESS NOTE    Interval History:  Patient seen and examined at bedside.  Pain is controlled.  No complaints of chest pain, SOB, N/V.    MEDICATIONS  (STANDING):  atorvastatin 20 milliGRAM(s) Oral at bedtime  busPIRone 10 milliGRAM(s) Oral two times a day  dextrose 5%. 1000 milliLiter(s) (100 mL/Hr) IV Continuous <Continuous>  dextrose 5%. 1000 milliLiter(s) (50 mL/Hr) IV Continuous <Continuous>  dextrose 50% Injectable 25 Gram(s) IV Push once  dextrose 50% Injectable 12.5 Gram(s) IV Push once  dextrose 50% Injectable 25 Gram(s) IV Push once  diltiazem    milliGRAM(s) Oral daily  enoxaparin Injectable 40 milliGRAM(s) SubCutaneous every 24 hours  escitalopram 20 milliGRAM(s) Oral daily  furosemide    Tablet 20 milliGRAM(s) Oral two times a day  glucagon  Injectable 1 milliGRAM(s) IntraMuscular once  insulin lispro (ADMELOG) corrective regimen sliding scale   SubCutaneous three times a day before meals  insulin lispro (ADMELOG) corrective regimen sliding scale   SubCutaneous at bedtime  levothyroxine 75 MICROGram(s) Oral daily  lisinopril 10 milliGRAM(s) Oral daily  mirtazapine 15 milliGRAM(s) Oral at bedtime  pantoprazole    Tablet 40 milliGRAM(s) Oral before breakfast  polyethylene glycol 3350 17 Gram(s) Oral daily  propranolol 10 milliGRAM(s) Oral three times a day  senna 2 Tablet(s) Oral at bedtime  timolol 0.5% Solution 1 Drop(s) Both EYES two times a day    MEDICATIONS  (PRN):  albuterol    90 MICROgram(s) HFA Inhaler 1 Puff(s) Inhalation four times a day PRN for shortness of breath and/or wheezing  dextrose Oral Gel 15 Gram(s) Oral once PRN Blood Glucose LESS THAN 70 milliGRAM(s)/deciliter  diphenhydrAMINE 25 milliGRAM(s) Oral every 6 hours PRN Rash and/or Itching  melatonin 5 milliGRAM(s) Oral at bedtime PRN Insomnia  ondansetron Injectable 4 milliGRAM(s) IV Push every 4 hours PRN Nausea and/or Vomiting  oxyCODONE    IR 5 milliGRAM(s) Oral every 6 hours PRN Moderate Pain (4 - 6)  oxyCODONE    IR 10 milliGRAM(s) Oral every 6 hours PRN Severe Pain (7 - 10)  traMADol 50 milliGRAM(s) Oral every 6 hours PRN Mild Pain (1 - 3)      Vital Signs Last 24 Hrs  T(C): 37.1 (06 Feb 2025 08:18), Max: 37.6 (05 Feb 2025 20:40)  T(F): 98.7 (06 Feb 2025 08:18), Max: 99.7 (05 Feb 2025 20:40)  HR: 71 (06 Feb 2025 08:18) (65 - 100)  BP: 93/57 (06 Feb 2025 08:18) (90/50 - 127/73)  BP(mean): --  RR: 18 (06 Feb 2025 08:18) (18 - 18)  SpO2: 100% (06 Feb 2025 08:18) (95% - 100%)    Physical Exam:   Dressing C/D/I, minor spotting  Compartments soft and compressible  Motor intact distally  SILT distally  CR<2sec, palpable pulses                           8.3    10.25 )-----------( 134      ( 06 Feb 2025 05:45 )             25.6     02-06    142  |  110  |  14  ----------------------------<  111[H]  4.3   |  22  |  0.7    Ca    8.4      06 Feb 2025 05:45        Urinalysis Basic - ( 06 Feb 2025 05:45 )    Color: x / Appearance: x / SG: x / pH: x  Gluc: 111 mg/dL / Ketone: x  / Bili: x / Urobili: x   Blood: x / Protein: x / Nitrite: x   Leuk Esterase: x / RBC: x / WBC x   Sq Epi: x / Non Sq Epi: x / Bacteria: x          A/P: 74yFemale s/p L5/5 PSF.     pain control   follow up hospitalization co-management   needs lso brace   incentive spirometer  pt rehab   24 hour abx   am labs   dispo planning       - If discharged, patient should follow up with Dr. Puga at [address] [place] [address]., phone [phone].  - Patient should be discharged on 6 weeks (from surgery date) of Aspirin 325mg daily for DVT prophylaxis as their mobility/function/ambulation will be decreased due to lower extremity orthopaedic surgery.

## 2025-02-06 NOTE — CONSULT NOTE ADULT - SUBJECTIVE AND OBJECTIVE BOX
74 year old female with PMH of HTN, DM2, HLD and back pain was admitted for planned spine surgery s/p L4/L5 fusion on 2/4, hospitalist service consulted for Hypotension, patient feels weak, mild to mod pain at the site of surgery, she denies chest pain, SOB, palpitation, dizziness or syncope, she was noted with anemia, Hb dropped from 13 in Jan 2025 to 8.4, likely post op, she denies melena, hematochezia, hematemesis or hematuria. Patient takes Lasix for left leg edema. No history of CHF.     PMH: HTN, Asthma, DM, HLD  PSH:       Vital Signs Last 24 Hrs  T(C): 37.1 (06 Feb 2025 08:18), Max: 37.6 (05 Feb 2025 20:40)  T(F): 98.7 (06 Feb 2025 08:18), Max: 99.7 (05 Feb 2025 20:40)  HR: 71 (06 Feb 2025 08:18) (67 - 100)  BP: 93/57 (06 Feb 2025 08:18) (93/57 - 127/73)  BP(mean): --  RR: 18 (06 Feb 2025 08:18) (18 - 18)  SpO2: 100% (06 Feb 2025 08:18) (96% - 100%)    Parameters below as of 06 Feb 2025 04:18  Patient On (Oxygen Delivery Method): room air                Consultant(s) Notes Reviewed:  [x ] YES  [ ] NO          MEDICATIONS  (STANDING):  atorvastatin 20 milliGRAM(s) Oral at bedtime  busPIRone 10 milliGRAM(s) Oral two times a day  dextrose 5%. 1000 milliLiter(s) (100 mL/Hr) IV Continuous <Continuous>  dextrose 5%. 1000 milliLiter(s) (50 mL/Hr) IV Continuous <Continuous>  dextrose 50% Injectable 25 Gram(s) IV Push once  dextrose 50% Injectable 12.5 Gram(s) IV Push once  dextrose 50% Injectable 25 Gram(s) IV Push once  diltiazem    milliGRAM(s) Oral daily  enoxaparin Injectable 40 milliGRAM(s) SubCutaneous every 24 hours  escitalopram 20 milliGRAM(s) Oral daily  glucagon  Injectable 1 milliGRAM(s) IntraMuscular once  insulin lispro (ADMELOG) corrective regimen sliding scale   SubCutaneous three times a day before meals  insulin lispro (ADMELOG) corrective regimen sliding scale   SubCutaneous at bedtime  levothyroxine 75 MICROGram(s) Oral daily  mirtazapine 15 milliGRAM(s) Oral at bedtime  pantoprazole    Tablet 40 milliGRAM(s) Oral before breakfast  polyethylene glycol 3350 17 Gram(s) Oral daily  propranolol 10 milliGRAM(s) Oral three times a day  senna 2 Tablet(s) Oral at bedtime  timolol 0.5% Solution 1 Drop(s) Both EYES two times a day    MEDICATIONS  (PRN):  albuterol    90 MICROgram(s) HFA Inhaler 1 Puff(s) Inhalation four times a day PRN for shortness of breath and/or wheezing  dextrose Oral Gel 15 Gram(s) Oral once PRN Blood Glucose LESS THAN 70 milliGRAM(s)/deciliter  diphenhydrAMINE 25 milliGRAM(s) Oral every 6 hours PRN Rash and/or Itching  melatonin 5 milliGRAM(s) Oral at bedtime PRN Insomnia  ondansetron Injectable 4 milliGRAM(s) IV Push every 4 hours PRN Nausea and/or Vomiting  oxyCODONE    IR 5 milliGRAM(s) Oral every 6 hours PRN Moderate Pain (4 - 6)  oxyCODONE    IR 10 milliGRAM(s) Oral every 6 hours PRN Severe Pain (7 - 10)  traMADol 50 milliGRAM(s) Oral every 6 hours PRN Mild Pain (1 - 3)      LABS                          8.3    10.25 )-----------( 134      ( 06 Feb 2025 05:45 )             25.6     02-06    142  |  110  |  14  ----------------------------<  111[H]  4.3   |  22  |  0.7    Ca    8.4      06 Feb 2025 05:45        Urinalysis Basic - ( 06 Feb 2025 05:45 )    Color: x / Appearance: x / SG: x / pH: x  Gluc: 111 mg/dL / Ketone: x  / Bili: x / Urobili: x   Blood: x / Protein: x / Nitrite: x   Leuk Esterase: x / RBC: x / WBC x   Sq Epi: x / Non Sq Epi: x / Bacteria: x        Lactate Trend        CAPILLARY BLOOD GLUCOSE  118 (05 Feb 2025 08:03)      POCT Blood Glucose.: 130 mg/dL (06 Feb 2025 11:14)        RADIOLOGY & ADDITIONAL TESTS:    Imaging Personally Reviewed:  [ ] YES  [ ] NO    HEALTH ISSUES - PROBLEM Dx:          PHYSICAL EXAM:  GENERAL: NAD, well-developed.  HEAD:  Atraumatic, Normocephalic.  EYES: EOMI, PERRLA, conjunctiva and sclera clear.  NECK: Supple, No JVD.  CHEST/LUNG: Clear to auscultation bilaterally; No wheeze.  HEART: Regular rate and rhythm; S1 S2.   ABDOMEN: Soft, Nontender, brace on her trunk,   EXTREMITIES:  2+ Peripheral Pulses, No clubbing, cyanosis, or edema.  PSYCH: AAOx3.

## 2025-02-06 NOTE — CONSULT NOTE ADULT - ASSESSMENT
Hypotension:   Acute blood loss anemia: likely from surgery  She denies any melena or hematochezia, had large bowel movement yesterday  Hold Lasix and Lisinopril for now, can give Cardizem if SBP>100  Monitor Hb tomorrow, if stable no further work up.   No indication for Lasix, BP is on the lower side, will keep Lasix and Lisinopril on hold on discharge.     DM controlled  obesity  hypothyroidism: on synthroid  HLD

## 2025-02-07 LAB
GLUCOSE BLDC GLUCOMTR-MCNC: 111 MG/DL — HIGH (ref 70–99)
GLUCOSE BLDC GLUCOMTR-MCNC: 128 MG/DL — HIGH (ref 70–99)
GLUCOSE BLDC GLUCOMTR-MCNC: 129 MG/DL — HIGH (ref 70–99)
GLUCOSE BLDC GLUCOMTR-MCNC: 131 MG/DL — HIGH (ref 70–99)
HCT VFR BLD CALC: 24.8 % — LOW (ref 37–47)
HGB BLD-MCNC: 7.9 G/DL — LOW (ref 12–16)
MCHC RBC-ENTMCNC: 29 PG — SIGNIFICANT CHANGE UP (ref 27–31)
MCHC RBC-ENTMCNC: 31.9 G/DL — LOW (ref 32–37)
MCV RBC AUTO: 91.2 FL — SIGNIFICANT CHANGE UP (ref 81–99)
NRBC # BLD: 0 /100 WBCS — SIGNIFICANT CHANGE UP (ref 0–0)
NRBC BLD-RTO: 0 /100 WBCS — SIGNIFICANT CHANGE UP (ref 0–0)
PLATELET # BLD AUTO: 132 K/UL — SIGNIFICANT CHANGE UP (ref 130–400)
PMV BLD: 13.4 FL — HIGH (ref 7.4–10.4)
RBC # BLD: 2.72 M/UL — LOW (ref 4.2–5.4)
RBC # FLD: 16 % — HIGH (ref 11.5–14.5)
WBC # BLD: 11.18 K/UL — HIGH (ref 4.8–10.8)
WBC # FLD AUTO: 11.18 K/UL — HIGH (ref 4.8–10.8)

## 2025-02-07 PROCEDURE — 71045 X-RAY EXAM CHEST 1 VIEW: CPT | Mod: 26

## 2025-02-07 PROCEDURE — 99232 SBSQ HOSP IP/OBS MODERATE 35: CPT

## 2025-02-07 RX ADMIN — MIRTAZAPINE 15 MILLIGRAM(S): 30 TABLET, FILM COATED ORAL at 21:21

## 2025-02-07 RX ADMIN — Medication 0: at 17:28

## 2025-02-07 RX ADMIN — ESCITALOPRAM 20 MILLIGRAM(S): 10 TABLET, FILM COATED ORAL at 11:10

## 2025-02-07 RX ADMIN — Medication 2 TABLET(S): at 21:19

## 2025-02-07 RX ADMIN — Medication 0: at 11:44

## 2025-02-07 RX ADMIN — OXYCODONE HYDROCHLORIDE 5 MILLIGRAM(S): 30 TABLET ORAL at 21:54

## 2025-02-07 RX ADMIN — Medication 0: at 08:25

## 2025-02-07 RX ADMIN — ATORVASTATIN CALCIUM 20 MILLIGRAM(S): 80 TABLET, FILM COATED ORAL at 21:21

## 2025-02-07 RX ADMIN — BUSPIRONE HYDROCHLORIDE 10 MILLIGRAM(S): 5 TABLET ORAL at 05:44

## 2025-02-07 RX ADMIN — OXYCODONE HYDROCHLORIDE 5 MILLIGRAM(S): 30 TABLET ORAL at 21:24

## 2025-02-07 RX ADMIN — TIMOLOL MALEATE 1 DROP(S): 5 SOLUTION OPHTHALMIC at 05:44

## 2025-02-07 RX ADMIN — Medication 0: at 21:19

## 2025-02-07 RX ADMIN — PANTOPRAZOLE 40 MILLIGRAM(S): 20 TABLET, DELAYED RELEASE ORAL at 05:44

## 2025-02-07 RX ADMIN — ENOXAPARIN SODIUM 40 MILLIGRAM(S): 100 INJECTION SUBCUTANEOUS at 13:19

## 2025-02-07 RX ADMIN — LEVOTHYROXINE SODIUM 75 MICROGRAM(S): 25 TABLET ORAL at 05:44

## 2025-02-07 RX ADMIN — TIMOLOL MALEATE 1 DROP(S): 5 SOLUTION OPHTHALMIC at 17:28

## 2025-02-07 NOTE — PROGRESS NOTE ADULT - SUBJECTIVE AND OBJECTIVE BOX
SUBJECTIVE:   ANNETTE HARRIS (74y Female) was seen sitting on chair. Not in acute distress. She endorses weakness and fatigue. Denies any other symptoms.     REVIEW OF SYSTEM:   RESPIRATORY: No cough, wheezing, hemoptysis, or shortness of breath  CARDIOVASCULAR: No chest pain, palpitations, or leg swelling  GASTROINTESTINAL: No abdominal pain, abdominal distension, nausea, vomiting, hematemesis, diarrhea, constipation, melena, or hematochezia.  GENITOURINARY: No dysuria, or flank tenderness   NEUROLOGICAL: No headaches, loss of strength, loss of sensation, slurred speech, or tremors  MUSCULOSKELETAL: No joint pain, joint swelling, back pain    PHYSICAL EXAM:  HEENT: clear conjunctiva pupils bilaterally equal and reactive to light, no apparent LAD  NERVOUS SYSTEM:  Alert & Oriented to name/place/person. Moves all extremities. No sensory deficits. No tremors. No CN deficits.   LUNG: Bilateral clear air entry. No wheezing or crackles.   HEART: S1, S2 audible. No abnormal cardiac murmur.   ABDOMEN: Soft, nontender, nondistended, BS+  EXTREMITIES:  DP pedis pules palpable, no edema, no cyanosis, warm to touch.   SKIN: No rashes, or lesions    Vital Signs Last 24 Hrs  T(C): 36.9 (07 Feb 2025 08:22), Max: 37.3 (06 Feb 2025 20:39)  T(F): 98.5 (07 Feb 2025 08:22), Max: 99.1 (06 Feb 2025 20:39)  HR: 60 (07 Feb 2025 09:40) (60 - 78)  BP: 111/53 (07 Feb 2025 09:40) (95/49 - 139/65)  BP(mean): --  RR: 18 (07 Feb 2025 08:22) (18 - 18)  SpO2: 97% (07 Feb 2025 09:40) (95% - 98%)    Parameters below as of 07 Feb 2025 09:40  Patient On (Oxygen Delivery Method): nasal cannula, 2 liters    CAPILLARY BLOOD GLUCOSE  POCT Blood Glucose.: 128 mg/dL (07 Feb 2025 07:26)  POCT Blood Glucose.: 145 mg/dL (06 Feb 2025 20:59)  POCT Blood Glucose.: 111 mg/dL (06 Feb 2025 16:37)  POCT Blood Glucose.: 130 mg/dL (06 Feb 2025 11:14)    MEDICATIONS  (STANDING):  atorvastatin 20 milliGRAM(s) Oral at bedtime  busPIRone 10 milliGRAM(s) Oral two times a day  dextrose 5%. 1000 milliLiter(s) (50 mL/Hr) IV Continuous <Continuous>  dextrose 5%. 1000 milliLiter(s) (100 mL/Hr) IV Continuous <Continuous>  dextrose 50% Injectable 25 Gram(s) IV Push once  dextrose 50% Injectable 12.5 Gram(s) IV Push once  dextrose 50% Injectable 25 Gram(s) IV Push once  diltiazem    milliGRAM(s) Oral daily  enoxaparin Injectable 40 milliGRAM(s) SubCutaneous every 24 hours  escitalopram 20 milliGRAM(s) Oral daily  glucagon  Injectable 1 milliGRAM(s) IntraMuscular once  insulin lispro (ADMELOG) corrective regimen sliding scale   SubCutaneous three times a day before meals  insulin lispro (ADMELOG) corrective regimen sliding scale   SubCutaneous at bedtime  levothyroxine 75 MICROGram(s) Oral daily  mirtazapine 15 milliGRAM(s) Oral at bedtime  pantoprazole    Tablet 40 milliGRAM(s) Oral before breakfast  propranolol 10 milliGRAM(s) Oral three times a day  senna 2 Tablet(s) Oral at bedtime  timolol 0.5% Solution 1 Drop(s) Both EYES two times a day    MEDICATIONS  (PRN):  albuterol    90 MICROgram(s) HFA Inhaler 1 Puff(s) Inhalation four times a day PRN for shortness of breath and/or wheezing  dextrose Oral Gel 15 Gram(s) Oral once PRN Blood Glucose LESS THAN 70 milliGRAM(s)/deciliter  diphenhydrAMINE 25 milliGRAM(s) Oral every 6 hours PRN Rash and/or Itching  melatonin 5 milliGRAM(s) Oral at bedtime PRN Insomnia  ondansetron Injectable 4 milliGRAM(s) IV Push every 4 hours PRN Nausea and/or Vomiting  oxyCODONE    IR 5 milliGRAM(s) Oral every 6 hours PRN Moderate Pain (4 - 6)  oxyCODONE    IR 10 milliGRAM(s) Oral every 6 hours PRN Severe Pain (7 - 10)  traMADol 50 milliGRAM(s) Oral every 6 hours PRN Mild Pain (1 - 3)

## 2025-02-07 NOTE — PROGRESS NOTE ADULT - SUBJECTIVE AND OBJECTIVE BOX
Orthopaedic Surgery Progress Note    S&E at bedside this AM. Pain well controlled. Denies fever/chills/CP/SOB. No other complaints. States she has less lower extremity symptoms which included numb toes preop     T(C): 37.2 (02-07-25 @ 04:45), Max: 37.3 (02-06-25 @ 20:39)  HR: 70 (02-07-25 @ 04:45) (69 - 78)  BP: 113/78 (02-07-25 @ 04:45) (93/57 - 139/65)  RR: 18 (02-07-25 @ 04:45) (18 - 18)  SpO2: 95% (02-07-25 @ 04:45) (95% - 100%)    P/E:  Alert, NAD  Nonlabored breathing    Dressing changed this morning     B/L LE  5/5 ankle DF PF EHL KE HF  SILT distally  CR<2sec, palpable pulses  Labs                        7.9    11.18 )-----------( 132      ( 07 Feb 2025 04:36 )             24.8     02-06    142  |  110  |  14  ----------------------------<  111[H]  4.3   |  22  |  0.7    Ca    8.4      06 Feb 2025 05:45      A/P:   75yo Female s/p L4-5 MIS TLIF.     PO pain medication  SCDs and lovenox for DVT ppx  Incentive spirometer bedside  Mobilize with PT OT and Out of bed  LSO when out of bed but does not need brace prior to working with PT  Dispo planning- rehab vs home

## 2025-02-08 LAB
ANION GAP SERPL CALC-SCNC: 7 MMOL/L — SIGNIFICANT CHANGE UP (ref 7–14)
BUN SERPL-MCNC: 10 MG/DL — SIGNIFICANT CHANGE UP (ref 10–20)
CALCIUM SERPL-MCNC: 8.1 MG/DL — LOW (ref 8.4–10.5)
CHLORIDE SERPL-SCNC: 103 MMOL/L — SIGNIFICANT CHANGE UP (ref 98–110)
CO2 SERPL-SCNC: 26 MMOL/L — SIGNIFICANT CHANGE UP (ref 17–32)
CREAT SERPL-MCNC: 0.5 MG/DL — LOW (ref 0.7–1.5)
EGFR: 99 ML/MIN/1.73M2 — SIGNIFICANT CHANGE UP
GLUCOSE BLDC GLUCOMTR-MCNC: 102 MG/DL — HIGH (ref 70–99)
GLUCOSE BLDC GLUCOMTR-MCNC: 107 MG/DL — HIGH (ref 70–99)
GLUCOSE BLDC GLUCOMTR-MCNC: 111 MG/DL — HIGH (ref 70–99)
GLUCOSE BLDC GLUCOMTR-MCNC: 130 MG/DL — HIGH (ref 70–99)
GLUCOSE SERPL-MCNC: 102 MG/DL — HIGH (ref 70–99)
HCT VFR BLD CALC: 24.7 % — LOW (ref 37–47)
HGB BLD-MCNC: 8 G/DL — LOW (ref 12–16)
MCHC RBC-ENTMCNC: 28.8 PG — SIGNIFICANT CHANGE UP (ref 27–31)
MCHC RBC-ENTMCNC: 32.4 G/DL — SIGNIFICANT CHANGE UP (ref 32–37)
MCV RBC AUTO: 88.8 FL — SIGNIFICANT CHANGE UP (ref 81–99)
NRBC # BLD: 0 /100 WBCS — SIGNIFICANT CHANGE UP (ref 0–0)
NRBC BLD-RTO: 0 /100 WBCS — SIGNIFICANT CHANGE UP (ref 0–0)
PLATELET # BLD AUTO: 158 K/UL — SIGNIFICANT CHANGE UP (ref 130–400)
PMV BLD: 12.8 FL — HIGH (ref 7.4–10.4)
POTASSIUM SERPL-MCNC: 4.1 MMOL/L — SIGNIFICANT CHANGE UP (ref 3.5–5)
POTASSIUM SERPL-SCNC: 4.1 MMOL/L — SIGNIFICANT CHANGE UP (ref 3.5–5)
RBC # BLD: 2.78 M/UL — LOW (ref 4.2–5.4)
RBC # FLD: 15.7 % — HIGH (ref 11.5–14.5)
SODIUM SERPL-SCNC: 136 MMOL/L — SIGNIFICANT CHANGE UP (ref 135–146)
WBC # BLD: 8.2 K/UL — SIGNIFICANT CHANGE UP (ref 4.8–10.8)
WBC # FLD AUTO: 8.2 K/UL — SIGNIFICANT CHANGE UP (ref 4.8–10.8)

## 2025-02-08 PROCEDURE — 99232 SBSQ HOSP IP/OBS MODERATE 35: CPT

## 2025-02-08 RX ORDER — ASPIRIN 81 MG/1
81 TABLET, COATED ORAL DAILY
Refills: 0 | Status: DISCONTINUED | OUTPATIENT
Start: 2025-02-08 | End: 2025-02-13

## 2025-02-08 RX ORDER — IRON SUCROSE 20 MG/ML
200 INJECTION, SOLUTION INTRAVENOUS EVERY 24 HOURS
Refills: 0 | Status: COMPLETED | OUTPATIENT
Start: 2025-02-08 | End: 2025-02-10

## 2025-02-08 RX ORDER — IRON SUCROSE 20 MG/ML
200 INJECTION, SOLUTION INTRAVENOUS EVERY 24 HOURS
Refills: 0 | Status: DISCONTINUED | OUTPATIENT
Start: 2025-02-08 | End: 2025-02-08

## 2025-02-08 RX ADMIN — PANTOPRAZOLE 40 MILLIGRAM(S): 20 TABLET, DELAYED RELEASE ORAL at 05:39

## 2025-02-08 RX ADMIN — LEVOTHYROXINE SODIUM 75 MICROGRAM(S): 25 TABLET ORAL at 05:39

## 2025-02-08 RX ADMIN — OXYCODONE HYDROCHLORIDE 5 MILLIGRAM(S): 30 TABLET ORAL at 21:04

## 2025-02-08 RX ADMIN — TIMOLOL MALEATE 1 DROP(S): 5 SOLUTION OPHTHALMIC at 05:38

## 2025-02-08 RX ADMIN — ATORVASTATIN CALCIUM 20 MILLIGRAM(S): 80 TABLET, FILM COATED ORAL at 21:06

## 2025-02-08 RX ADMIN — ESCITALOPRAM 20 MILLIGRAM(S): 10 TABLET, FILM COATED ORAL at 11:38

## 2025-02-08 RX ADMIN — ENOXAPARIN SODIUM 40 MILLIGRAM(S): 100 INJECTION SUBCUTANEOUS at 11:38

## 2025-02-08 RX ADMIN — TIMOLOL MALEATE 1 DROP(S): 5 SOLUTION OPHTHALMIC at 17:04

## 2025-02-08 RX ADMIN — DILTIAZEM HYDROCHLORIDE 120 MILLIGRAM(S): 60 TABLET ORAL at 05:39

## 2025-02-08 RX ADMIN — Medication 2 TABLET(S): at 21:05

## 2025-02-08 RX ADMIN — IRON SUCROSE 100 MILLIGRAM(S): 20 INJECTION, SOLUTION INTRAVENOUS at 17:04

## 2025-02-08 RX ADMIN — MIRTAZAPINE 15 MILLIGRAM(S): 30 TABLET, FILM COATED ORAL at 21:06

## 2025-02-08 NOTE — PROGRESS NOTE ADULT - SUBJECTIVE AND OBJECTIVE BOX
SUBJECTIVE:   ANNETTE HARRIS (74y Female) was seen sitting on chair. Not in acute distress. She endorses weakness and fatigue. Denies any other symptoms.     REVIEW OF SYSTEM:   RESPIRATORY: No cough, wheezing, hemoptysis, or shortness of breath  CARDIOVASCULAR: No chest pain, palpitations, or leg swelling  GASTROINTESTINAL: No abdominal pain, abdominal distension, nausea, vomiting, hematemesis, diarrhea, constipation, melena, or hematochezia.  GENITOURINARY: No dysuria, or flank tenderness   NEUROLOGICAL: No headaches, loss of strength, loss of sensation, slurred speech, or tremors  MUSCULOSKELETAL: No joint pain, joint swelling, back pain    PHYSICAL EXAM:  HEENT: clear conjunctiva pupils bilaterally equal and reactive to light, no apparent LAD  NERVOUS SYSTEM:  Alert & Oriented to name/place/person. Moves all extremities. No sensory deficits. No tremors. No CN deficits.   LUNG: Bilateral clear air entry. No wheezing or crackles.   HEART: S1, S2 audible. No abnormal cardiac murmur.   ABDOMEN: Soft, nontender, nondistended, BS+  EXTREMITIES:  DP pedis pules palpable, no edema, no cyanosis, warm to touch.   SKIN: No rashes, or lesions    Vital Signs Last 24 Hrs  T(C): 36.7 (08 Feb 2025 11:36), Max: 37.3 (08 Feb 2025 00:14)  T(F): 98 (08 Feb 2025 11:36), Max: 99.1 (08 Feb 2025 00:14)  HR: 74 (08 Feb 2025 11:36) (73 - 93)  BP: 123/60 (08 Feb 2025 11:36) (93/56 - 123/60)  BP(mean): --  RR: 18 (08 Feb 2025 11:36) (18 - 18)  SpO2: 98% (08 Feb 2025 11:36) (95% - 98%)    Parameters below as of 08 Feb 2025 11:36  Patient On (Oxygen Delivery Method): room air    CAPILLARY BLOOD GLUCOSE  POCT Blood Glucose.: 107 mg/dL (08 Feb 2025 11:13)  POCT Blood Glucose.: 111 mg/dL (08 Feb 2025 07:27)  POCT Blood Glucose.: 131 mg/dL (07 Feb 2025 21:03)  POCT Blood Glucose.: 129 mg/dL (07 Feb 2025 17:12)      MEDICATIONS  (STANDING):  atorvastatin 20 milliGRAM(s) Oral at bedtime  dextrose 5%. 1000 milliLiter(s) (100 mL/Hr) IV Continuous <Continuous>  dextrose 5%. 1000 milliLiter(s) (50 mL/Hr) IV Continuous <Continuous>  dextrose 50% Injectable 25 Gram(s) IV Push once  dextrose 50% Injectable 12.5 Gram(s) IV Push once  dextrose 50% Injectable 25 Gram(s) IV Push once  diltiazem    milliGRAM(s) Oral daily  enoxaparin Injectable 40 milliGRAM(s) SubCutaneous every 24 hours  escitalopram 20 milliGRAM(s) Oral daily  glucagon  Injectable 1 milliGRAM(s) IntraMuscular once  insulin lispro (ADMELOG) corrective regimen sliding scale   SubCutaneous three times a day before meals  insulin lispro (ADMELOG) corrective regimen sliding scale   SubCutaneous at bedtime  levothyroxine 75 MICROGram(s) Oral daily  mirtazapine 15 milliGRAM(s) Oral at bedtime  pantoprazole    Tablet 40 milliGRAM(s) Oral before breakfast  senna 2 Tablet(s) Oral at bedtime  timolol 0.5% Solution 1 Drop(s) Both EYES two times a day    MEDICATIONS  (PRN):  albuterol    90 MICROgram(s) HFA Inhaler 1 Puff(s) Inhalation four times a day PRN for shortness of breath and/or wheezing  dextrose Oral Gel 15 Gram(s) Oral once PRN Blood Glucose LESS THAN 70 milliGRAM(s)/deciliter  diphenhydrAMINE 25 milliGRAM(s) Oral every 6 hours PRN Rash and/or Itching  melatonin 5 milliGRAM(s) Oral at bedtime PRN Insomnia  ondansetron Injectable 4 milliGRAM(s) IV Push every 4 hours PRN Nausea and/or Vomiting  oxyCODONE    IR 5 milliGRAM(s) Oral every 6 hours PRN Moderate Pain (4 - 6)  oxyCODONE    IR 10 milliGRAM(s) Oral every 6 hours PRN Severe Pain (7 - 10)  traMADol 50 milliGRAM(s) Oral every 6 hours PRN Mild Pain (1 - 3)

## 2025-02-08 NOTE — PROGRESS NOTE ADULT - SUBJECTIVE AND OBJECTIVE BOX
Orthopaedic Surgery Progress Note    S&E at bedside this AM. Pain well controlled. Denies fever/chills/CP/SOB. No other complaints. States she has less lower extremity symptoms which included numb toes preop     T(C): 37.2 (02-07-25 @ 04:45), Max: 37.3 (02-06-25 @ 20:39)  HR: 70 (02-07-25 @ 04:45) (69 - 78)  BP: 113/78 (02-07-25 @ 04:45) (93/57 - 139/65)  RR: 18 (02-07-25 @ 04:45) (18 - 18)  SpO2: 95% (02-07-25 @ 04:45) (95% - 100%)    P/E:  Alert, NAD  Nonlabored breathing    Dressing changed this morning     B/L LE  5/5 ankle DF PF EHL KE HF  SILT distally  CR<2sec, palpable pulses  Labs                        7.9    11.18 )-----------( 132      ( 07 Feb 2025 04:36 )             24.8     02-06    142  |  110  |  14  ----------------------------<  111[H]  4.3   |  22  |  0.7    Ca    8.4      06 Feb 2025 05:45      A/P:   73yo Female s/p L4-5 MIS TLIF.     PO pain medication  SCDs and lovenox for DVT ppx  Incentive spirometer bedside  Mobilize with PT OT and Out of bed  LSO when out of bed but does not need brace prior to working with PT  Dispo planning- rehab vs home

## 2025-02-09 LAB
GLUCOSE BLDC GLUCOMTR-MCNC: 103 MG/DL — HIGH (ref 70–99)
GLUCOSE BLDC GLUCOMTR-MCNC: 117 MG/DL — HIGH (ref 70–99)
GLUCOSE BLDC GLUCOMTR-MCNC: 121 MG/DL — HIGH (ref 70–99)
GLUCOSE BLDC GLUCOMTR-MCNC: 138 MG/DL — HIGH (ref 70–99)

## 2025-02-09 PROCEDURE — 99232 SBSQ HOSP IP/OBS MODERATE 35: CPT

## 2025-02-09 RX ORDER — FERROUS SULFATE 325(65) MG
325 TABLET ORAL
Refills: 0 | Status: DISCONTINUED | OUTPATIENT
Start: 2025-02-09 | End: 2025-02-13

## 2025-02-09 RX ADMIN — Medication 325 MILLIGRAM(S): at 17:05

## 2025-02-09 RX ADMIN — MIRTAZAPINE 15 MILLIGRAM(S): 30 TABLET, FILM COATED ORAL at 21:37

## 2025-02-09 RX ADMIN — ATORVASTATIN CALCIUM 20 MILLIGRAM(S): 80 TABLET, FILM COATED ORAL at 21:37

## 2025-02-09 RX ADMIN — IRON SUCROSE 100 MILLIGRAM(S): 20 INJECTION, SOLUTION INTRAVENOUS at 17:06

## 2025-02-09 RX ADMIN — TIMOLOL MALEATE 1 DROP(S): 5 SOLUTION OPHTHALMIC at 05:17

## 2025-02-09 RX ADMIN — Medication 2 TABLET(S): at 21:36

## 2025-02-09 RX ADMIN — ESCITALOPRAM 20 MILLIGRAM(S): 10 TABLET, FILM COATED ORAL at 11:05

## 2025-02-09 RX ADMIN — DILTIAZEM HYDROCHLORIDE 120 MILLIGRAM(S): 60 TABLET ORAL at 05:17

## 2025-02-09 RX ADMIN — TIMOLOL MALEATE 1 DROP(S): 5 SOLUTION OPHTHALMIC at 17:06

## 2025-02-09 RX ADMIN — ENOXAPARIN SODIUM 40 MILLIGRAM(S): 100 INJECTION SUBCUTANEOUS at 11:15

## 2025-02-09 RX ADMIN — LEVOTHYROXINE SODIUM 75 MICROGRAM(S): 25 TABLET ORAL at 05:17

## 2025-02-09 RX ADMIN — PANTOPRAZOLE 40 MILLIGRAM(S): 20 TABLET, DELAYED RELEASE ORAL at 05:17

## 2025-02-09 RX ADMIN — ASPIRIN 81 MILLIGRAM(S): 81 TABLET, COATED ORAL at 11:05

## 2025-02-09 NOTE — PROGRESS NOTE ADULT - SUBJECTIVE AND OBJECTIVE BOX
Orthopaedic Surgery Progress Note    S&E at bedside this AM. Pain well controlled. Denies fever/chills/CP/SOB. No other complaints.     T(C): 37.2 (02-07-25 @ 04:45), Max: 37.3 (02-06-25 @ 20:39)  HR: 70 (02-07-25 @ 04:45) (69 - 78)  BP: 113/78 (02-07-25 @ 04:45) (93/57 - 139/65)  RR: 18 (02-07-25 @ 04:45) (18 - 18)  SpO2: 95% (02-07-25 @ 04:45) (95% - 100%)    P/E:  Alert, NAD  Nonlabored breathing    Dressing changed this morning     B/L LE  5/5 ankle DF PF EHL KE HF  SILT distally  CR<2sec, palpable pulses  Labs                        7.9    11.18 )-----------( 132      ( 07 Feb 2025 04:36 )             24.8     02-06    142  |  110  |  14  ----------------------------<  111[H]  4.3   |  22  |  0.7    Ca    8.4      06 Feb 2025 05:45      A/P:   73yo Female s/p L4-5 MIS TLIF.     PO pain medication  SCDs and lovenox for DVT ppx  Incentive spirometer bedside  Mobilize with PT OT and Out of bed  LSO when out of bed but does not need brace prior to working with PT  Dispo planning- rehab vs home, awaiting placement

## 2025-02-09 NOTE — PROVIDER CONTACT NOTE (OTHER) - SITUATION
Patient's BP 95/49 and patient on 2liters. Requested fluids, MD states pt is fine. Requested O2 order
Patients IV became not patent during iron infusion.
pt failed tov  x2. Bladder scan shows 737ml.
vs as noted above. Pt is due for cardizem and propranolol.
daytime Rn removed Bob as per orders TOV due at 2130

## 2025-02-09 NOTE — PROGRESS NOTE ADULT - SUBJECTIVE AND OBJECTIVE BOX
SUBJECTIVE:   ANNETTE HARRIS (74y Female) was seen sitting on chair. She is in good spirit and is looking forward for discharger. Her pain is well optimized. Denies any other symptoms.     REVIEW OF SYSTEM:   RESPIRATORY: No cough, wheezing, hemoptysis, or shortness of breath  CARDIOVASCULAR: No chest pain, palpitations, or leg swelling  GASTROINTESTINAL: No abdominal pain, abdominal distension, nausea, vomiting, hematemesis, diarrhea, constipation, melena, or hematochezia.  GENITOURINARY: No dysuria, or flank tenderness   NEUROLOGICAL: No headaches, loss of strength, loss of sensation, slurred speech, or tremors  MUSCULOSKELETAL: No joint pain, joint swelling, back pain    PHYSICAL EXAM:  GENERAL: pallor   HEENT: clear conjunctiva pupils bilaterally equal and reactive to light, no apparent LAD  NERVOUS SYSTEM:  Alert & Oriented to name/place/person. Moves all extremities. No sensory deficits. No tremors. No CN deficits.   LUNG: Bilateral clear air entry. No wheezing or crackles.   HEART: S1, S2 audible. No abnormal cardiac murmur.   ABDOMEN: Soft, nontender, nondistended, BS+  EXTREMITIES:  DP pedis pules palpable, no edema, no cyanosis, warm to touch.   SKIN: No rashes, or lesions    Vital Signs Last 24 Hrs  T(C): 36.7 (09 Feb 2025 07:47), Max: 37.3 (09 Feb 2025 04:28)  T(F): 98.1 (09 Feb 2025 07:47), Max: 99.1 (09 Feb 2025 04:28)  HR: 78 (09 Feb 2025 07:47) (74 - 88)  BP: 104/64 (09 Feb 2025 07:47) (103/61 - 123/60)  BP(mean): --  RR: 18 (09 Feb 2025 07:47) (18 - 19)  SpO2: 97% (09 Feb 2025 07:47) (96% - 98%)    Parameters below as of 08 Feb 2025 16:05  Patient On (Oxygen Delivery Method): room air    CAPILLARY BLOOD GLUCOSE  POCT Blood Glucose.: 117 mg/dL (09 Feb 2025 07:43)  POCT Blood Glucose.: 130 mg/dL (08 Feb 2025 21:37)  POCT Blood Glucose.: 102 mg/dL (08 Feb 2025 16:48)  POCT Blood Glucose.: 107 mg/dL (08 Feb 2025 11:13)      MEDICATIONS  (STANDING):  aspirin  chewable 81 milliGRAM(s) Oral daily  atorvastatin 20 milliGRAM(s) Oral at bedtime  dextrose 5%. 1000 milliLiter(s) (100 mL/Hr) IV Continuous <Continuous>  dextrose 5%. 1000 milliLiter(s) (50 mL/Hr) IV Continuous <Continuous>  dextrose 50% Injectable 25 Gram(s) IV Push once  dextrose 50% Injectable 12.5 Gram(s) IV Push once  dextrose 50% Injectable 25 Gram(s) IV Push once  diltiazem    milliGRAM(s) Oral daily  enoxaparin Injectable 40 milliGRAM(s) SubCutaneous every 24 hours  escitalopram 20 milliGRAM(s) Oral daily  glucagon  Injectable 1 milliGRAM(s) IntraMuscular once  insulin lispro (ADMELOG) corrective regimen sliding scale   SubCutaneous three times a day before meals  insulin lispro (ADMELOG) corrective regimen sliding scale   SubCutaneous at bedtime  iron sucrose IVPB 200 milliGRAM(s) IV Intermittent every 24 hours  levothyroxine 75 MICROGram(s) Oral daily  mirtazapine 15 milliGRAM(s) Oral at bedtime  pantoprazole    Tablet 40 milliGRAM(s) Oral before breakfast  propranolol 10 milliGRAM(s) Oral two times a day  senna 2 Tablet(s) Oral at bedtime  timolol 0.5% Solution 1 Drop(s) Both EYES two times a day    MEDICATIONS  (PRN):  albuterol    90 MICROgram(s) HFA Inhaler 1 Puff(s) Inhalation four times a day PRN for shortness of breath and/or wheezing  dextrose Oral Gel 15 Gram(s) Oral once PRN Blood Glucose LESS THAN 70 milliGRAM(s)/deciliter  diphenhydrAMINE 25 milliGRAM(s) Oral every 6 hours PRN Rash and/or Itching  melatonin 5 milliGRAM(s) Oral at bedtime PRN Insomnia  ondansetron Injectable 4 milliGRAM(s) IV Push every 4 hours PRN Nausea and/or Vomiting  oxyCODONE    IR 5 milliGRAM(s) Oral every 6 hours PRN Moderate Pain (4 - 6)  oxyCODONE    IR 10 milliGRAM(s) Oral every 6 hours PRN Severe Pain (7 - 10)  traMADol 50 milliGRAM(s) Oral every 6 hours PRN Mild Pain (1 - 3)

## 2025-02-09 NOTE — PROVIDER CONTACT NOTE (OTHER) - ACTION/TREATMENT ORDERED:
MD Irene notified. I and another RN attempted to put a new one and was unsuccessful. On coming RN will attempt.
Pt straight cath as per MD - 850ml removed.
no intervention at this time
MD states to hold both PO medications.

## 2025-02-10 LAB
GLUCOSE BLDC GLUCOMTR-MCNC: 102 MG/DL — HIGH (ref 70–99)
GLUCOSE BLDC GLUCOMTR-MCNC: 104 MG/DL — HIGH (ref 70–99)
GLUCOSE BLDC GLUCOMTR-MCNC: 113 MG/DL — HIGH (ref 70–99)
GLUCOSE BLDC GLUCOMTR-MCNC: 128 MG/DL — HIGH (ref 70–99)
HCT VFR BLD CALC: 25.1 % — LOW (ref 37–47)
HGB BLD-MCNC: 8 G/DL — LOW (ref 12–16)
MCHC RBC-ENTMCNC: 28.6 PG — SIGNIFICANT CHANGE UP (ref 27–31)
MCHC RBC-ENTMCNC: 31.9 G/DL — LOW (ref 32–37)
MCV RBC AUTO: 89.6 FL — SIGNIFICANT CHANGE UP (ref 81–99)
NRBC # BLD: 0 /100 WBCS — SIGNIFICANT CHANGE UP (ref 0–0)
NRBC BLD-RTO: 0 /100 WBCS — SIGNIFICANT CHANGE UP (ref 0–0)
PLATELET # BLD AUTO: 160 K/UL — SIGNIFICANT CHANGE UP (ref 130–400)
PMV BLD: 13.4 FL — HIGH (ref 7.4–10.4)
RBC # BLD: 2.8 M/UL — LOW (ref 4.2–5.4)
RBC # FLD: 15.8 % — HIGH (ref 11.5–14.5)
WBC # BLD: 8.4 K/UL — SIGNIFICANT CHANGE UP (ref 4.8–10.8)
WBC # FLD AUTO: 8.4 K/UL — SIGNIFICANT CHANGE UP (ref 4.8–10.8)

## 2025-02-10 RX ADMIN — TIMOLOL MALEATE 1 DROP(S): 5 SOLUTION OPHTHALMIC at 18:21

## 2025-02-10 RX ADMIN — ATORVASTATIN CALCIUM 20 MILLIGRAM(S): 80 TABLET, FILM COATED ORAL at 23:00

## 2025-02-10 RX ADMIN — TIMOLOL MALEATE 1 DROP(S): 5 SOLUTION OPHTHALMIC at 05:39

## 2025-02-10 RX ADMIN — OXYCODONE HYDROCHLORIDE 10 MILLIGRAM(S): 30 TABLET ORAL at 23:32

## 2025-02-10 RX ADMIN — DILTIAZEM HYDROCHLORIDE 120 MILLIGRAM(S): 60 TABLET ORAL at 05:37

## 2025-02-10 RX ADMIN — MIRTAZAPINE 15 MILLIGRAM(S): 30 TABLET, FILM COATED ORAL at 23:00

## 2025-02-10 RX ADMIN — LEVOTHYROXINE SODIUM 75 MICROGRAM(S): 25 TABLET ORAL at 05:37

## 2025-02-10 RX ADMIN — Medication 2 TABLET(S): at 23:00

## 2025-02-10 RX ADMIN — PANTOPRAZOLE 40 MILLIGRAM(S): 20 TABLET, DELAYED RELEASE ORAL at 05:37

## 2025-02-10 RX ADMIN — OXYCODONE HYDROCHLORIDE 5 MILLIGRAM(S): 30 TABLET ORAL at 00:24

## 2025-02-10 RX ADMIN — ENOXAPARIN SODIUM 40 MILLIGRAM(S): 100 INJECTION SUBCUTANEOUS at 11:29

## 2025-02-10 RX ADMIN — IRON SUCROSE 100 MILLIGRAM(S): 20 INJECTION, SOLUTION INTRAVENOUS at 23:18

## 2025-02-10 RX ADMIN — Medication 0: at 11:56

## 2025-02-10 RX ADMIN — OXYCODONE HYDROCHLORIDE 10 MILLIGRAM(S): 30 TABLET ORAL at 23:02

## 2025-02-10 RX ADMIN — Medication 0: at 08:15

## 2025-02-10 RX ADMIN — Medication 325 MILLIGRAM(S): at 18:17

## 2025-02-10 RX ADMIN — ASPIRIN 81 MILLIGRAM(S): 81 TABLET, COATED ORAL at 11:29

## 2025-02-10 RX ADMIN — ESCITALOPRAM 20 MILLIGRAM(S): 10 TABLET, FILM COATED ORAL at 11:29

## 2025-02-10 RX ADMIN — Medication 325 MILLIGRAM(S): at 05:37

## 2025-02-10 NOTE — PROGRESS NOTE ADULT - SUBJECTIVE AND OBJECTIVE BOX
Orthopaedic Surgery Progress Note    S&E at bedside this AM. Pain well controlled. Denies fever/chills/CP/SOB. No other complaints.       Vital Signs Last 24 Hrs  T(C): 37.6 (02-10-25 @ 00:18), Max: 37.6 (02-10-25 @ 00:18)  T(F): 99.7 (02-10-25 @ 00:18), Max: 99.7 (02-10-25 @ 00:18)  HR: 98 (02-10-25 @ 00:18) (69 - 98)  BP: 105/67 (02-10-25 @ 00:18) (105/67 - 119/58)  BP(mean): --  RR: 18 (02-10-25 @ 00:18) (18 - 19)  SpO2: 96% (02-10-25 @ 00:18) (96% - 100%)      P/E:  Alert, NAD  Nonlabored breathing    Dressing c/d/i    B/L LE  5/5 ankle DF PF EHL KE HF  SILT distally  CR<2sec, palpable pulses  Labs              A/P:   75yo Female s/p L4-5 MIS TLIF.     PO pain medication  SCDs and lovenox for DVT ppx  Incentive spirometer bedside  Mobilize with PT OT and Out of bed  LSO when out of bed but does not need brace prior to working with PT  Dispo planning- rehab vs home, awaiting placement  Off BP meds- BP has been stable; if anything on low side; will not re-instate BP meds at this time

## 2025-02-11 ENCOUNTER — TRANSCRIPTION ENCOUNTER (OUTPATIENT)
Age: 75
End: 2025-02-11

## 2025-02-11 LAB
GLUCOSE BLDC GLUCOMTR-MCNC: 107 MG/DL — HIGH (ref 70–99)
GLUCOSE BLDC GLUCOMTR-MCNC: 113 MG/DL — HIGH (ref 70–99)
GLUCOSE BLDC GLUCOMTR-MCNC: 129 MG/DL — HIGH (ref 70–99)
GLUCOSE BLDC GLUCOMTR-MCNC: 179 MG/DL — HIGH (ref 70–99)
HCT VFR BLD CALC: 26.2 % — LOW (ref 37–47)
HGB BLD-MCNC: 8.4 G/DL — LOW (ref 12–16)
MCHC RBC-ENTMCNC: 28.9 PG — SIGNIFICANT CHANGE UP (ref 27–31)
MCHC RBC-ENTMCNC: 32.1 G/DL — SIGNIFICANT CHANGE UP (ref 32–37)
MCV RBC AUTO: 90 FL — SIGNIFICANT CHANGE UP (ref 81–99)
NRBC BLD AUTO-RTO: 0 /100 WBCS — SIGNIFICANT CHANGE UP (ref 0–0)
PLATELET # BLD AUTO: 248 K/UL — SIGNIFICANT CHANGE UP (ref 130–400)
PMV BLD: 12.4 FL — HIGH (ref 7.4–10.4)
RBC # BLD: 2.91 M/UL — LOW (ref 4.2–5.4)
RBC # FLD: 16.1 % — HIGH (ref 11.5–14.5)
WBC # BLD: 9.69 K/UL — SIGNIFICANT CHANGE UP (ref 4.8–10.8)
WBC # FLD AUTO: 9.69 K/UL — SIGNIFICANT CHANGE UP (ref 4.8–10.8)

## 2025-02-11 RX ORDER — OXYCODONE HYDROCHLORIDE 30 MG/1
1 TABLET ORAL
Qty: 20 | Refills: 0
Start: 2025-02-11 | End: 2025-02-15

## 2025-02-11 RX ORDER — FERROUS SULFATE 325(65) MG
1 TABLET ORAL
Qty: 28 | Refills: 0
Start: 2025-02-11 | End: 2025-02-24

## 2025-02-11 RX ORDER — SENNOSIDES 8.6 MG
2 TABLET ORAL
Qty: 28 | Refills: 0
Start: 2025-02-11 | End: 2025-02-24

## 2025-02-11 RX ORDER — ASPIRIN 81 MG/1
1 TABLET, COATED ORAL
Qty: 60 | Refills: 0
Start: 2025-02-11 | End: 2025-03-12

## 2025-02-11 RX ORDER — ACETAMINOPHEN 160 MG/5ML
2 SUSPENSION ORAL
Qty: 42 | Refills: 0
Start: 2025-02-11 | End: 2025-02-17

## 2025-02-11 RX ORDER — MAGNESIUM SULFATE 0.8 MEQ/ML
1 AMPUL (ML) INJECTION ONCE
Refills: 0 | Status: DISCONTINUED | OUTPATIENT
Start: 2025-02-11 | End: 2025-02-11

## 2025-02-11 RX ADMIN — Medication 325 MILLIGRAM(S): at 18:21

## 2025-02-11 RX ADMIN — LEVOTHYROXINE SODIUM 75 MICROGRAM(S): 25 TABLET ORAL at 06:05

## 2025-02-11 RX ADMIN — PANTOPRAZOLE 40 MILLIGRAM(S): 20 TABLET, DELAYED RELEASE ORAL at 06:05

## 2025-02-11 RX ADMIN — OXYCODONE HYDROCHLORIDE 5 MILLIGRAM(S): 30 TABLET ORAL at 18:26

## 2025-02-11 RX ADMIN — Medication 325 MILLIGRAM(S): at 06:05

## 2025-02-11 RX ADMIN — OXYCODONE HYDROCHLORIDE 5 MILLIGRAM(S): 30 TABLET ORAL at 21:16

## 2025-02-11 RX ADMIN — ESCITALOPRAM 20 MILLIGRAM(S): 10 TABLET, FILM COATED ORAL at 12:10

## 2025-02-11 RX ADMIN — ACETAMINOPHEN, DIPHENHYDRAMINE HCL, PHENYLEPHRINE HCL 5 MILLIGRAM(S): 325; 25; 5 TABLET ORAL at 21:16

## 2025-02-11 RX ADMIN — ATORVASTATIN CALCIUM 20 MILLIGRAM(S): 80 TABLET, FILM COATED ORAL at 21:14

## 2025-02-11 RX ADMIN — DILTIAZEM HYDROCHLORIDE 120 MILLIGRAM(S): 60 TABLET ORAL at 06:05

## 2025-02-11 RX ADMIN — TIMOLOL MALEATE 1 DROP(S): 5 SOLUTION OPHTHALMIC at 18:22

## 2025-02-11 RX ADMIN — OXYCODONE HYDROCHLORIDE 5 MILLIGRAM(S): 30 TABLET ORAL at 21:46

## 2025-02-11 RX ADMIN — MIRTAZAPINE 15 MILLIGRAM(S): 30 TABLET, FILM COATED ORAL at 21:14

## 2025-02-11 RX ADMIN — Medication 2 TABLET(S): at 21:14

## 2025-02-11 RX ADMIN — ENOXAPARIN SODIUM 40 MILLIGRAM(S): 100 INJECTION SUBCUTANEOUS at 12:10

## 2025-02-11 RX ADMIN — Medication 0: at 21:15

## 2025-02-11 RX ADMIN — OXYCODONE HYDROCHLORIDE 5 MILLIGRAM(S): 30 TABLET ORAL at 09:44

## 2025-02-11 RX ADMIN — ASPIRIN 81 MILLIGRAM(S): 81 TABLET, COATED ORAL at 12:10

## 2025-02-11 RX ADMIN — TIMOLOL MALEATE 1 DROP(S): 5 SOLUTION OPHTHALMIC at 06:06

## 2025-02-11 NOTE — DISCHARGE NOTE PROVIDER - CARE PROVIDER_API CALL
Mark Puga  Orthopaedic Surgery  [address] [place] [address]  Phone: [phone]  Fax: [phone]  Follow Up Time:

## 2025-02-11 NOTE — CHART NOTE - NSCHARTNOTEFT_GEN_A_CORE
PACU ANESTHESIA ADMISSION NOTE      Procedure: Fusion, spine, lumbar, TLIF, 1-2 levels      Post op diagnosis:  Lumbar stenosis        __x__  Patent Airway    __x__  Full return of protective reflexes    __x__  Full recovery from anesthesia / back to baseline status    Vitals:  T(C): 36.3 (02-04-25 @ 14:28), Max: 36.6 (02-04-25 @ 07:42)  HR: 59 (02-04-25 @ 16:30) (57 - 83)  BP: 140/65 (02-04-25 @ 16:30) (95/52 - 154/73)  RR: 15 (02-04-25 @ 16:30) (14 - 22)  SpO2: 96% (02-04-25 @ 16:30) (96% - 100%)    Mental Status:  __x__ Awake   ___x__ Alert   _____ Drowsy   _____ Sedated    Nausea/Vomiting:  __x__ NO  ______Yes,   See Post - Op Orders          Pain Scale (0-10):  _____    Treatment: ____ None    __x__ See Post - Op/PCA Orders    Post - Operative Fluids:   ____ Oral   __x__ See Post - Op Orders    Plan: Discharge:   ____Home       _____Floor     _____Critical Care    _____  Other:_________________    Comments: Patient had smooth intraoperative event, no anesthesia complication, moving all extremities, following commands. Vital signs stable, Report given to RN
letter of medical necessity     rolling walker     pt has limited mobility and limitation that significantly impairs her ability to participate in one or more mradls such as toileting eating dressing and bathing,   patients home provides adequate access between rooms for use of a Valentin rolling walker.   the rolling walker will significantly improve pts ability to participate in mrdals and will be used on a regular basis in the home.   pt participated with pt and did well with walker and has sufficient upper body strength to use walker.
Consult received for "MST Score = />2." Upon chart review and patient interview, patient with weight loss due to being on Ozempic prescribed by her PCP.     Patient reports UBW is 87.7kg; reports some weight loss since October 2024 after being prescribed Ozempic by her PCP to help her control her DM. Reports stress leading up to recent procedure as a secondary reason for weight loss. Patient reports her PCP informing her that weight loss may be a potential side effect of the medication. Current weight documented is 81.6kg, reflective of patient report.     Patient reports adequate appetite at home. Patient receives 2 meals/day from [employer]. Reports allergy to carrots, celery, endives, stone fruit, and some types of apples.     No s/s of malnutrition noted at this time. Patient does not currently meet criteria for Protein Calorie Malnutrition risk per MST. RD remains available for assessment per protocol or as needed.    Rasheed Gill RD via Teams
letter of medical necessity     pt requires a rolling walker in order to ambulate   it is unsafe for her to use any other form of medical equipment.   she needs rolling walker for balance
letter of medical necessity     spinal fusion surgery     due to pts recent spinal fusion surgery she requires a commode as she does not have a bathroom on same floor as her bedroom.   commode is needed for safety as she has difficulty with ambulation.   in addition can also use as a shower chair.  she is unable to stand long time so aid of shower chair will be beneficial   pt also benefit from transfer tub bench for safety

## 2025-02-11 NOTE — DISCHARGE NOTE PROVIDER - NSDCCPCAREPLAN_GEN_ALL_CORE_FT
PRINCIPAL DISCHARGE DIAGNOSIS  Diagnosis: S/P lumbar fusion  Assessment and Plan of Treatment: keep incision clean and dry.  if increased pain fever or swelling call md office.    please take aspirin 81mg every 12 hours for next 30 days post op for blood clot prevention.  after 30 days resume once daily dosing  weight bearing as tolerated with a walker.   please follow up with dr eddy llamas 2 weeks post op.   call for appointment.     PRINCIPAL DISCHARGE DIAGNOSIS  Diagnosis: S/P lumbar fusion  Assessment and Plan of Treatment: keep incision clean and dry.  if increased pain fever or swelling call md office.    please take aspirin 81mg every 12 hours for next 30 days post op for blood clot prevention.  after 30 days resume once daily dosing  weight bearing as tolerated with a walker.   please follow up with dr eddy llaams 2 weeks post op.   call for appointment.  do not take lasix, hydrochlorothiazide and lisinopril due to low blood pressure.   please follow up with pmd asap to discuss meds.   please monitor blood pressure daily.  any concerns call md office     PRINCIPAL DISCHARGE DIAGNOSIS  Diagnosis: S/P lumbar fusion  Assessment and Plan of Treatment: keep incision clean and dry.  if increased pain fever or swelling call md office.    please take aspirin 81mg every 12 hours for next 30 days post op for blood clot prevention.  after 30 days resume once daily dosing  weight bearing as tolerated with a walker.   please follow up with dr eddy llamas 2 weeks post op.   call for appointment.  due to post op hypotension bp meds hydrochlorothiazide and lisinopril due to low blood pressure.   lasix was also held but due to lower extremity swelling was restrated 2/13 but once daily dosing.    as per hospitalist lasix daily, once bp stabilized can resume twice daily dosiing of lasix.    in addition can restart farxiga on friday 2/14 if bp is appropriate.    please follow up with pmd to discuss meds resing previous held bp meds lisinopril/hctz .   please monitor blood pressure daily.  any concerns call pmd office     PRINCIPAL DISCHARGE DIAGNOSIS  Diagnosis: S/P lumbar fusion  Assessment and Plan of Treatment: keep incision clean and dry.  if increased pain fever or swelling call md office.    please take aspirin 81mg every 12 hours for next 30 days post op for blood clot prevention.  after 30 days resume once daily dosing  weight bearing as tolerated with a walker.   please follow up with dr eddy llamas 2 weeks post op.   call for appointment.  due to post op hypotension bp meds hydrochlorothiazide and lisinopril due to low blood pressure.   lasix was also held but due to lower extremity swelling was restrated 2/13 but once daily dosing.    as per hospitalist lasix daily, once bp stabilized can resume twice daily dosing of lasix.    in addition can restart farxiga on friday 2/14 if bp is appropriate.    please follow up with pmd to discuss restarting previously held bp meds lisinopril/hctz .   please monitor blood pressure daily.  any concerns call pmd office

## 2025-02-11 NOTE — DISCHARGE NOTE PROVIDER - NSDCFUSCHEDAPPT_GEN_ALL_CORE_FT
Juana Youngblood  [facility] PreAdmits  Scheduled Appointment: 02/19/2025    [facility]  ENDOCRIN [address]  Scheduled Appointment: 02/19/2025    Mark Puga  [facility]  ONCORTHO [address]  Scheduled Appointment: 02/21/2025    Js Henley  [facility] PreAdmits  Scheduled Appointment: 04/29/2025    Js Henley  [facility]  OPHTHALM  [address]  Scheduled Appointment: 04/29/2025    Roosevelt Morgan  [facility]  CARDIOLOGY [address]  Scheduled Appointment: 04/30/2025

## 2025-02-11 NOTE — CHART NOTE - NSCHARTNOTESELECT_GEN_ALL_CORE
MST Consult/Nutrition Services
lomn commode/Event Note
lomn rolling walker/Event Note
Transfer Note
lomn walker/Event Note

## 2025-02-11 NOTE — DISCHARGE NOTE PROVIDER - NSDCMRMEDTOKEN_GEN_ALL_CORE_FT
Albuterol (Eqv-Ventolin HFA) 90 mcg/inh inhalation aerosol: 2 puff(s) inhaled 4 times a day as needed for  shortness of breath and/or wheezing  Arnuity Ellipta 100 mcg inhalation powder: 1 puff(s) inhaled once a day  aspirin 81 mg oral tablet, chewable: 1 tab(s) orally every 12 hours blood clot prevention.   take for 30 days then resume once daily dosing  atorvastatin 20 mg oral tablet: 1 tab(s) orally once a day (at bedtime)  benazepril 10 mg oral tablet: 1 tab(s) orally once a day  busPIRone 10 mg oral tablet: 1 tab(s) orally 2 times a day  DilTIAZem (Eqv-Cardizem CD) 120 mg/24 hours oral capsule, extended release: 1 cap(s) orally once a day  escitalopram 10 mg oral tablet: 1 tab(s) orally once a day (at bedtime)  escitalopram 10 mg oral tablet: 1 tab(s) orally once a day (at bedtime)  escitalopram 20 mg oral tablet: 1 tab(s) orally once a day  Farxiga 10 mg oral tablet: 1 tab(s) orally once a day  levothyroxine 75 mcg (0.075 mg)/mL oral solution: 1 milliliter(s) orally once a day  Linzess 145 mcg oral capsule: 1 cap(s) orally as needed for  indigestion  memantine 10 mg oral tablet: 1 tab(s) orally 2 times a day  mirtazapine 15 mg oral tablet: 1 tab(s) orally once a day (at bedtime)  omeprazole 40 mg oral delayed release capsule: 1 cap(s) orally once a day  Ozempic 2 mg/1.5 mL (1 mg dose) subcutaneous solution: 1 milligram(s) subcutaneous once a week SATURDAYS  propranolol 10 mg oral tablet: 1 tab(s) orally 3 times a day  senna leaf extract oral tablet: 2 tab(s) orally once a day (at bedtime)  SPARVATO 56MG NOSE SPRAY ONCE A WEEK (THURSDAYS),  VITAMIN B12, VITAMIN D3 1000UNIT, PROBIOTIC,:   Timolol Maleate (Eqv-Timoptic) 0.5% ophthalmic solution: 1 drop(s) in each affected eye 2 times a day   Albuterol (Eqv-Ventolin HFA) 90 mcg/inh inhalation aerosol: 2 puff(s) inhaled 4 times a day as needed for  shortness of breath and/or wheezing  Arnuity Ellipta 100 mcg inhalation powder: 1 puff(s) inhaled once a day  aspirin 81 mg oral tablet, chewable: 1 tab(s) orally every 12 hours blood clot prevention.   take for 30 days then resume once daily dosing  atorvastatin 20 mg oral tablet: 1 tab(s) orally once a day (at bedtime)  busPIRone 10 mg oral tablet: 1 tab(s) orally 2 times a day  DilTIAZem (Eqv-Cardizem CD) 120 mg/24 hours oral capsule, extended release: 1 cap(s) orally once a day  escitalopram 20 mg oral tablet: 1 tab(s) orally once a day  Farxiga 10 mg oral tablet: 1 tab(s) orally once a day  ferrous sulfate 325 mg (65 mg elemental iron) oral tablet: 1 tab(s) orally 2 times a day  levothyroxine 75 mcg (0.075 mg)/mL oral solution: 1 milliliter(s) orally once a day  Linzess 145 mcg oral capsule: 1 cap(s) orally as needed for  indigestion  memantine 10 mg oral tablet: 1 tab(s) orally 2 times a day  mirtazapine 15 mg oral tablet: 1 tab(s) orally once a day (at bedtime)  omeprazole 40 mg oral delayed release capsule: 1 cap(s) orally once a day  Ozempic 2 mg/1.5 mL (1 mg dose) subcutaneous solution: 1 milligram(s) subcutaneous once a week SATURDAYS  propranolol 10 mg oral tablet: 1 tab(s) orally 3 times a day  senna leaf extract oral tablet: 2 tab(s) orally once a day (at bedtime)  SPARVATO 56MG NOSE SPRAY ONCE A WEEK (THURSDAYS),  VITAMIN B12, VITAMIN D3 1000UNIT, PROBIOTIC,:   Timolol Maleate (Eqv-Timoptic) 0.5% ophthalmic solution: 1 drop(s) in each affected eye 2 times a day   Albuterol (Eqv-Ventolin HFA) 90 mcg/inh inhalation aerosol: 2 puff(s) inhaled 4 times a day as needed for  shortness of breath and/or wheezing  Arnuity Ellipta 100 mcg inhalation powder: 1 puff(s) inhaled once a day  aspirin 81 mg oral tablet, chewable: 1 tab(s) orally every 12 hours blood clot prevention.   take for 30 days then resume once daily dosing  atorvastatin 20 mg oral tablet: 1 tab(s) orally once a day (at bedtime)  busPIRone 10 mg oral tablet: 1 tab(s) orally 2 times a day  DilTIAZem (Eqv-Cardizem CD) 120 mg/24 hours oral capsule, extended release: 1 cap(s) orally once a day  escitalopram 20 mg oral tablet: 1 tab(s) orally once a day  Farxiga 10 mg oral tablet: 1 tab(s) orally once a day  ferrous sulfate 325 mg (65 mg elemental iron) oral tablet: 1 tab(s) orally 2 times a day  levothyroxine 75 mcg (0.075 mg)/mL oral solution: 1 milliliter(s) orally once a day  Linzess 145 mcg oral capsule: 1 cap(s) orally as needed for  indigestion  memantine 10 mg oral tablet: 1 tab(s) orally 2 times a day  mirtazapine 15 mg oral tablet: 1 tab(s) orally once a day (at bedtime)  omeprazole 40 mg oral delayed release capsule: 1 cap(s) orally once a day  oxyCODONE 5 mg oral tablet: 1 tab(s) orally every 6 hours MDD: 4  Ozempic 2 mg/1.5 mL (1 mg dose) subcutaneous solution: 1 milligram(s) subcutaneous once a week SATURDAYS  propranolol 10 mg oral tablet: 1 tab(s) orally 3 times a day  senna leaf extract oral tablet: 2 tab(s) orally once a day (at bedtime)  SPARVATO 56MG NOSE SPRAY ONCE A WEEK (THURSDAYS),  VITAMIN B12, VITAMIN D3 1000UNIT, PROBIOTIC,:   Timolol Maleate (Eqv-Timoptic) 0.5% ophthalmic solution: 1 drop(s) in each affected eye 2 times a day  Tylenol 325 mg oral tablet: 2 tab(s) orally every 8 hours as needed for  mild pain   Albuterol (Eqv-Ventolin HFA) 90 mcg/inh inhalation aerosol: 2 puff(s) inhaled 4 times a day as needed for  shortness of breath and/or wheezing  Arnuity Ellipta 100 mcg inhalation powder: 1 puff(s) inhaled once a day  aspirin 81 mg oral tablet, chewable: 1 tab(s) orally every 12 hours blood clot prevention.   take for 30 days then resume once daily dosing  atorvastatin 20 mg oral tablet: 1 tab(s) orally once a day (at bedtime)  busPIRone 10 mg oral tablet: 1 tab(s) orally 2 times a day  DilTIAZem (Eqv-Cardizem CD) 120 mg/24 hours oral capsule, extended release: 1 cap(s) orally once a day  escitalopram 20 mg oral tablet: 1 tab(s) orally once a day  Farxiga 10 mg oral tablet: 1 tab(s) orally once a day  ferrous sulfate 325 mg (65 mg elemental iron) oral tablet: 1 tab(s) orally 2 times a day  furosemide 20 mg oral tablet: 1 tab(s) orally once a day  levothyroxine 75 mcg (0.075 mg)/mL oral solution: 1 milliliter(s) orally once a day  Linzess 145 mcg oral capsule: 1 cap(s) orally as needed for  indigestion  memantine 10 mg oral tablet: 1 tab(s) orally 2 times a day  mirtazapine 15 mg oral tablet: 1 tab(s) orally once a day (at bedtime)  omeprazole 40 mg oral delayed release capsule: 1 cap(s) orally once a day  oxyCODONE 5 mg oral tablet: 1 tab(s) orally every 6 hours MDD: 4  Ozempic 2 mg/1.5 mL (1 mg dose) subcutaneous solution: 1 milligram(s) subcutaneous once a week SATURDAYS  propranolol 10 mg oral tablet: 1 tab(s) orally 3 times a day  senna leaf extract oral tablet: 2 tab(s) orally once a day (at bedtime)  SPARVATO 56MG NOSE SPRAY ONCE A WEEK (THURSDAYS),  VITAMIN B12, VITAMIN D3 1000UNIT, PROBIOTIC,:   Timolol Maleate (Eqv-Timoptic) 0.5% ophthalmic solution: 1 drop(s) in each affected eye 2 times a day  Tylenol 325 mg oral tablet: 2 tab(s) orally every 8 hours as needed for  mild pain

## 2025-02-11 NOTE — DISCHARGE NOTE PROVIDER - NSDCFUADDINST_GEN_ALL_CORE_FT
keep incision clean and dry.  if increased pain fever or swelling call md office.    please take aspirin 81mg every 12 hours for next 30 days post op for blood clot prevention. after 30 days resume once daily dosing.  weight bearing as tolerated with a walker.   please follow up with dr eddy llamas 2 weeks post op.   call for appointment.     keep incision clean and dry.  if increased pain fever or swelling call md office.    please take aspirin 81mg every 12 hours for next 30 days post op for blood clot prevention. after 30 days resume once daily dosing.  weight bearing as tolerated with a walker.   please follow up with dr eddy llamas 2 weeks post op.   call for appointment.  do not take lasix, hydrochlorothiazide and lisinopril due to low blood pressure.   please follow up with pmd asap to discuss meds.   please monitor blood pressure daily.  any concerns call md office       keep incision clean and dry.  if increased pain fever or swelling call md office.    please take aspirin 81mg every 12 hours for next 30 days post op for blood clot prevention.  after 30 days resume once daily dosing  weight bearing as tolerated with a walker.   please follow up with dr eddy llamas 2 weeks post op.   call for appointment.  due to post op hypotension bp meds hydrochlorothiazide and lisinopril due to low blood pressure.   lasix was also held but due to lower extremity swelling was restrated 2/13 but once daily dosing.    as per hospitalist lasix daily, once bp stabilized can resume twice daily dosing of lasix.    in addition can restart farxiga on friday 2/14 if bp is appropriate.    please follow up with pmd to discuss meds restarting previous held bp meds lisinopril/hctz .   please monitor blood pressure daily.  any concerns call pmd office   keep incision clean and dry.  if increased pain fever or swelling call md office.    please take aspirin 81mg every 12 hours for next 30 days post op for blood clot prevention.  after 30 days resume once daily dosing  weight bearing as tolerated with a walker.   please follow up with dr eddy llamas 2 weeks post op.   call for appointment.  due to post op hypotension bp meds hydrochlorothiazide and lisinopril due to low blood pressure.   lasix was also held but due to lower extremity swelling was restarted 2/13 but once daily dosing.    as per hospitalist lasix daily, once bp stabilized can resume twice daily dosing of lasix.    in addition can restart farxiga on friday 2/14 if bp is appropriate.    please follow up with pmd to discuss restarting previously held bp meds lisinopril/hctz .   please monitor blood pressure daily.  any concerns call pmd office

## 2025-02-11 NOTE — PROGRESS NOTE ADULT - ATTENDING COMMENTS
POD1 L4-5 MIS TLIF  Patient seen bedside. Bob was removed this morning and she has a prima fit in place now given that the patient has chronic urinary incontinence and typically depends on diapers. She ambulated this morning in her room. Her lumbar pain is well controlled. She states that she has significant improvement in her preoperative symptoms of bilateral leg pain and tingling.    5/5 ankle DF PF EHL KE HF    Plan  DC pca and transition to PO meds  SCDs and lovenox for DVT ppx  Incentive spirometer bedside  Mobilize with PT OT and Out of bed  LSO when out of bed but does not need brace prior to working with PT  Dispo planning- rehab vs home  f/u AM labs
POD2 MIS L4-5 TLIF  significant resolution of pre op radicular pains  patient ambulating  complaining of some slight right thigh pain  LSO when OOB  IS  Lovenox  dispo planning: rehab
POD7  Insurance denied patient rehab  Has resolution of pre op radiculopathy  plan now for DC home
POD4 awaiting rehab  CXR neg  no radic  PT OT  patient mobilizing

## 2025-02-11 NOTE — DISCHARGE NOTE PROVIDER - HOSPITAL COURSE
On  patient underwent a l4-l5 tlif with dr cantor.  Pt tolerated the procedure well with no complications.    They were  transferred to recovery unit in stable condition then to floor.   Post op received antibiotics for 24 hours and was started on deep vein thrombosis prevention. Patient participated with physical therapy and did well and was cleared for discharge home with services weight bearing as tolerated with a walker.   Pt to follow up with [facility] as a outpatient.   DC meds as per dc med rec.

## 2025-02-12 LAB
GLUCOSE BLDC GLUCOMTR-MCNC: 113 MG/DL — HIGH (ref 70–99)
GLUCOSE BLDC GLUCOMTR-MCNC: 115 MG/DL — HIGH (ref 70–99)
GLUCOSE BLDC GLUCOMTR-MCNC: 117 MG/DL — HIGH (ref 70–99)
GLUCOSE BLDC GLUCOMTR-MCNC: 99 MG/DL — SIGNIFICANT CHANGE UP (ref 70–99)

## 2025-02-12 RX ORDER — ANTISEPTIC SURGICAL SCRUB 0.04 MG/ML
1 SOLUTION TOPICAL DAILY
Refills: 0 | Status: DISCONTINUED | OUTPATIENT
Start: 2025-02-12 | End: 2025-02-13

## 2025-02-12 RX ORDER — OXYCODONE HYDROCHLORIDE 30 MG/1
5 TABLET ORAL EVERY 6 HOURS
Refills: 0 | Status: DISCONTINUED | OUTPATIENT
Start: 2025-02-12 | End: 2025-02-13

## 2025-02-12 RX ADMIN — LEVOTHYROXINE SODIUM 75 MICROGRAM(S): 25 TABLET ORAL at 05:32

## 2025-02-12 RX ADMIN — TIMOLOL MALEATE 1 DROP(S): 5 SOLUTION OPHTHALMIC at 17:03

## 2025-02-12 RX ADMIN — Medication 0: at 22:20

## 2025-02-12 RX ADMIN — DILTIAZEM HYDROCHLORIDE 120 MILLIGRAM(S): 60 TABLET ORAL at 05:32

## 2025-02-12 RX ADMIN — OXYCODONE HYDROCHLORIDE 5 MILLIGRAM(S): 30 TABLET ORAL at 21:40

## 2025-02-12 RX ADMIN — PANTOPRAZOLE 40 MILLIGRAM(S): 20 TABLET, DELAYED RELEASE ORAL at 05:32

## 2025-02-12 RX ADMIN — ANTISEPTIC SURGICAL SCRUB 1 APPLICATION(S): 0.04 SOLUTION TOPICAL at 11:30

## 2025-02-12 RX ADMIN — ESCITALOPRAM 20 MILLIGRAM(S): 10 TABLET, FILM COATED ORAL at 11:29

## 2025-02-12 RX ADMIN — ASPIRIN 81 MILLIGRAM(S): 81 TABLET, COATED ORAL at 11:29

## 2025-02-12 RX ADMIN — ENOXAPARIN SODIUM 40 MILLIGRAM(S): 100 INJECTION SUBCUTANEOUS at 11:29

## 2025-02-12 RX ADMIN — Medication 325 MILLIGRAM(S): at 05:32

## 2025-02-12 RX ADMIN — MIRTAZAPINE 15 MILLIGRAM(S): 30 TABLET, FILM COATED ORAL at 21:40

## 2025-02-12 RX ADMIN — TIMOLOL MALEATE 1 DROP(S): 5 SOLUTION OPHTHALMIC at 05:32

## 2025-02-12 RX ADMIN — ATORVASTATIN CALCIUM 20 MILLIGRAM(S): 80 TABLET, FILM COATED ORAL at 21:40

## 2025-02-12 RX ADMIN — Medication 325 MILLIGRAM(S): at 17:03

## 2025-02-12 RX ADMIN — OXYCODONE HYDROCHLORIDE 5 MILLIGRAM(S): 30 TABLET ORAL at 05:32

## 2025-02-12 RX ADMIN — OXYCODONE HYDROCHLORIDE 5 MILLIGRAM(S): 30 TABLET ORAL at 22:10

## 2025-02-12 NOTE — PROGRESS NOTE ADULT - SUBJECTIVE AND OBJECTIVE BOX
73yo Female s/p L4-5 MIS TLIF, pod 8    Pt seen at bedside, comfortable in bed    MEDICATIONS  (STANDING):  aspirin  chewable 81 milliGRAM(s) Oral daily  atorvastatin 20 milliGRAM(s) Oral at bedtime  dextrose 5%. 1000 milliLiter(s) (50 mL/Hr) IV Continuous <Continuous>  dextrose 5%. 1000 milliLiter(s) (100 mL/Hr) IV Continuous <Continuous>  dextrose 50% Injectable 25 Gram(s) IV Push once  dextrose 50% Injectable 12.5 Gram(s) IV Push once  dextrose 50% Injectable 25 Gram(s) IV Push once  diltiazem    milliGRAM(s) Oral daily  enoxaparin Injectable 40 milliGRAM(s) SubCutaneous every 24 hours  escitalopram 20 milliGRAM(s) Oral daily  ferrous    sulfate 325 milliGRAM(s) Oral two times a day  glucagon  Injectable 1 milliGRAM(s) IntraMuscular once  insulin lispro (ADMELOG) corrective regimen sliding scale   SubCutaneous three times a day before meals  insulin lispro (ADMELOG) corrective regimen sliding scale   SubCutaneous at bedtime  levothyroxine 75 MICROGram(s) Oral daily  mirtazapine 15 milliGRAM(s) Oral at bedtime  pantoprazole    Tablet 40 milliGRAM(s) Oral before breakfast  propranolol 10 milliGRAM(s) Oral two times a day  senna 2 Tablet(s) Oral at bedtime  timolol 0.5% Solution 1 Drop(s) Both EYES two times a day    MEDICATIONS  (PRN):  albuterol    90 MICROgram(s) HFA Inhaler 1 Puff(s) Inhalation four times a day PRN for shortness of breath and/or wheezing  dextrose Oral Gel 15 Gram(s) Oral once PRN Blood Glucose LESS THAN 70 milliGRAM(s)/deciliter  diphenhydrAMINE 25 milliGRAM(s) Oral every 6 hours PRN Rash and/or Itching  melatonin 5 milliGRAM(s) Oral at bedtime PRN Insomnia  ondansetron Injectable 4 milliGRAM(s) IV Push every 4 hours PRN Nausea and/or Vomiting  oxyCODONE    IR 5 milliGRAM(s) Oral every 6 hours PRN Moderate Pain (4 - 6)    NAD    Vital Signs Last 24 Hrs  T(C): 36.9 (12 Feb 2025 00:27), Max: 36.9 (11 Feb 2025 16:07)  T(F): 98.4 (12 Feb 2025 00:27), Max: 98.4 (11 Feb 2025 16:07)  HR: 80 (12 Feb 2025 00:27) (63 - 80)  BP: 103/70 (12 Feb 2025 00:27) (102/56 - 103/70)  BP(mean): --  RR: 18 (12 Feb 2025 00:27) (18 - 18)  SpO2: 95% (12 Feb 2025 00:27) (95% - 100%)      PE:    Dressing c/d/i  B/L LE  5/5 ankle DF PF EHL KE HF  SILT distally  CR<2sec, palpable pulses                            8.4    9.69  )-----------( 248      ( 11 Feb 2025 11:54 )             26.2                     A/P:   73yo Female s/p L4-5 MIS TLIF, pod 8    pain control-PO pain medication  SCDs and lovenox for DVT ppx  Incentive spirometer bedside  Mobilize with PT OT and Out of bed  LSO when out of bed but does not need brace prior to working with PT  Dispo planning- rehab vs home, awaiting placement   Off BP meds- BP has been stable; if anything on low side; will not re-instate BP meds at this time

## 2025-02-13 ENCOUNTER — TRANSCRIPTION ENCOUNTER (OUTPATIENT)
Age: 75
End: 2025-02-13

## 2025-02-13 VITALS
SYSTOLIC BLOOD PRESSURE: 135 MMHG | HEART RATE: 116 BPM | RESPIRATION RATE: 19 BRPM | OXYGEN SATURATION: 99 % | TEMPERATURE: 99 F | DIASTOLIC BLOOD PRESSURE: 71 MMHG

## 2025-02-13 LAB
GLUCOSE BLDC GLUCOMTR-MCNC: 105 MG/DL — HIGH (ref 70–99)
GLUCOSE BLDC GLUCOMTR-MCNC: 109 MG/DL — HIGH (ref 70–99)
GLUCOSE BLDC GLUCOMTR-MCNC: 142 MG/DL — HIGH (ref 70–99)

## 2025-02-13 PROCEDURE — 99232 SBSQ HOSP IP/OBS MODERATE 35: CPT

## 2025-02-13 RX ADMIN — OXYCODONE HYDROCHLORIDE 5 MILLIGRAM(S): 30 TABLET ORAL at 13:32

## 2025-02-13 RX ADMIN — OXYCODONE HYDROCHLORIDE 5 MILLIGRAM(S): 30 TABLET ORAL at 19:38

## 2025-02-13 RX ADMIN — Medication 20 MILLIGRAM(S): at 12:23

## 2025-02-13 RX ADMIN — ESCITALOPRAM 20 MILLIGRAM(S): 10 TABLET, FILM COATED ORAL at 12:21

## 2025-02-13 RX ADMIN — TIMOLOL MALEATE 1 DROP(S): 5 SOLUTION OPHTHALMIC at 06:22

## 2025-02-13 RX ADMIN — Medication 325 MILLIGRAM(S): at 06:22

## 2025-02-13 RX ADMIN — ENOXAPARIN SODIUM 40 MILLIGRAM(S): 100 INJECTION SUBCUTANEOUS at 12:20

## 2025-02-13 RX ADMIN — OXYCODONE HYDROCHLORIDE 5 MILLIGRAM(S): 30 TABLET ORAL at 12:23

## 2025-02-13 RX ADMIN — Medication 325 MILLIGRAM(S): at 19:00

## 2025-02-13 RX ADMIN — OXYCODONE HYDROCHLORIDE 5 MILLIGRAM(S): 30 TABLET ORAL at 03:45

## 2025-02-13 RX ADMIN — LEVOTHYROXINE SODIUM 75 MICROGRAM(S): 25 TABLET ORAL at 06:23

## 2025-02-13 RX ADMIN — PANTOPRAZOLE 40 MILLIGRAM(S): 20 TABLET, DELAYED RELEASE ORAL at 06:23

## 2025-02-13 RX ADMIN — ASPIRIN 81 MILLIGRAM(S): 81 TABLET, COATED ORAL at 12:21

## 2025-02-13 RX ADMIN — ANTISEPTIC SURGICAL SCRUB 1 APPLICATION(S): 0.04 SOLUTION TOPICAL at 12:25

## 2025-02-13 RX ADMIN — DILTIAZEM HYDROCHLORIDE 120 MILLIGRAM(S): 60 TABLET ORAL at 06:23

## 2025-02-13 RX ADMIN — TIMOLOL MALEATE 1 DROP(S): 5 SOLUTION OPHTHALMIC at 19:01

## 2025-02-13 RX ADMIN — OXYCODONE HYDROCHLORIDE 5 MILLIGRAM(S): 30 TABLET ORAL at 19:04

## 2025-02-13 NOTE — DISCHARGE NOTE NURSING/CASE MANAGEMENT/SOCIAL WORK - NSDCPEFALRISK_GEN_ALL_CORE
For information on Fall & Injury Prevention, visit: [email or web address] OR  [email or web address] OR  https://www.cdc.gov/steadi/patient.html None

## 2025-02-13 NOTE — DISCHARGE NOTE NURSING/CASE MANAGEMENT/SOCIAL WORK - FINANCIAL ASSISTANCE
[facility] provides services at a reduced cost to those who are determined to be eligible through [facility]’s financial assistance program. Information regarding [facility]’s financial assistance program can be found by going to [email or web address] or by calling [phone].

## 2025-02-13 NOTE — PROGRESS NOTE ADULT - PROVIDER SPECIALTY LIST ADULT
Hospitalist
Hospitalist
Orthopedics
Hospitalist
Hospitalist
Orthopedics
Orthopedics

## 2025-02-13 NOTE — PROGRESS NOTE ADULT - SUBJECTIVE AND OBJECTIVE BOX
Patient is a 74y old  Female who presents with a chief complaint of 73yo Female s/p L4-5 MIS TLIF.  (02-11-25)      Pt seen and examined at bedside. No CP or SOB.          PAST MEDICAL & SURGICAL HISTORY:  Hypertension    High cholesterol    Diabetes    Asthma    Sciatica    GERD (gastroesophageal reflux disease)    Incontinent of urine    History of cholecystectomy    H/O rotator cuff surgery  right shoulder        VITAL SIGNS (Last 24 hrs):  T(C): 36.8 (02-13-25 @ 07:59), Max: 36.8 (02-13-25 @ 00:28)  HR: 90 (02-13-25 @ 07:59) (69 - 90)  BP: 123/77 (02-13-25 @ 07:59) (103/64 - 123/77)  RR: 18 (02-13-25 @ 07:59) (18 - 18)  SpO2: 96% (02-13-25 @ 07:59) (96% - 98%)  Wt(kg): --  Daily     Daily     I&O's Summary      PHYSICAL EXAM:  GENERAL: NAD, well-developed  HEAD:  Atraumatic, Normocephalic  EYES: EOMI, PERRLA, conjunctiva and sclera clear  NECK: Supple, No JVD  CHEST/LUNG: Clear to auscultation bilaterally; No wheeze  HEART: Regular rate and rhythm; No murmurs, rubs, or gallops  ABDOMEN: Soft, Nontender, Nondistended; Bowel sounds present  EXTREMITIES:  2+ Peripheral Pulses, No clubbing, cyanosis, or edema  PSYCH: AAOx3  NEUROLOGY: non-focal  SKIN: No rashes or lesions    Labs Reviewed  Spoke to patient in regards to abnormal labs.    CBC Full  -  ( 11 Feb 2025 11:54 )  WBC Count : 9.69 K/uL  Hemoglobin : 8.4 g/dL  Hematocrit : 26.2 %  Platelet Count - Automated : 248 K/uL  Mean Cell Volume : 90.0 fL  Mean Cell Hemoglobin : 28.9 pg  Mean Cell Hemoglobin Concentration : 32.1 g/dL  Auto Neutrophil # : x  Auto Lymphocyte # : x  Auto Monocyte # : x  Auto Eosinophil # : x  Auto Basophil # : x  Auto Neutrophil % : x  Auto Lymphocyte % : x  Auto Monocyte % : x  Auto Eosinophil % : x  Auto Basophil % : x    BMP:      Hemoglobin A1c -     Urine Culture:        COVID Labs  CRP:      D-Dimer:            Imaging reviewed independently and reviewed read        MEDICATIONS  (STANDING):  aspirin  chewable 81 milliGRAM(s) Oral daily  atorvastatin 20 milliGRAM(s) Oral at bedtime  chlorhexidine 2% Cloths 1 Application(s) Topical daily  dextrose 5%. 1000 milliLiter(s) (100 mL/Hr) IV Continuous <Continuous>  dextrose 5%. 1000 milliLiter(s) (50 mL/Hr) IV Continuous <Continuous>  dextrose 50% Injectable 25 Gram(s) IV Push once  dextrose 50% Injectable 12.5 Gram(s) IV Push once  dextrose 50% Injectable 25 Gram(s) IV Push once  diltiazem    milliGRAM(s) Oral daily  enoxaparin Injectable 40 milliGRAM(s) SubCutaneous every 24 hours  escitalopram 20 milliGRAM(s) Oral daily  ferrous    sulfate 325 milliGRAM(s) Oral two times a day  furosemide    Tablet 20 milliGRAM(s) Oral daily  glucagon  Injectable 1 milliGRAM(s) IntraMuscular once  insulin lispro (ADMELOG) corrective regimen sliding scale   SubCutaneous three times a day before meals  insulin lispro (ADMELOG) corrective regimen sliding scale   SubCutaneous at bedtime  levothyroxine 75 MICROGram(s) Oral daily  mirtazapine 15 milliGRAM(s) Oral at bedtime  pantoprazole    Tablet 40 milliGRAM(s) Oral before breakfast  propranolol 10 milliGRAM(s) Oral two times a day  senna 2 Tablet(s) Oral at bedtime  timolol 0.5% Solution 1 Drop(s) Both EYES two times a day    MEDICATIONS  (PRN):  albuterol    90 MICROgram(s) HFA Inhaler 1 Puff(s) Inhalation four times a day PRN for shortness of breath and/or wheezing  dextrose Oral Gel 15 Gram(s) Oral once PRN Blood Glucose LESS THAN 70 milliGRAM(s)/deciliter  diphenhydrAMINE 25 milliGRAM(s) Oral every 6 hours PRN Rash and/or Itching  melatonin 5 milliGRAM(s) Oral at bedtime PRN Insomnia  ondansetron Injectable 4 milliGRAM(s) IV Push every 4 hours PRN Nausea and/or Vomiting  oxyCODONE    IR 5 milliGRAM(s) Oral every 6 hours PRN Moderate Pain (4 - 6)       Patient is a 74y old  Female who presents with a chief complaint of 73yo Female s/p L4-5 MIS TLIF.  (02-11-25)      Pt seen and examined at bedside. No CP or SOB.      PAST MEDICAL & SURGICAL HISTORY:  Hypertension    High cholesterol    Diabetes    Asthma    Sciatica    GERD (gastroesophageal reflux disease)    Incontinent of urine    History of cholecystectomy    H/O rotator cuff surgery  right shoulder        VITAL SIGNS (Last 24 hrs):  T(C): 36.8 (02-13-25 @ 07:59), Max: 36.8 (02-13-25 @ 00:28)  HR: 90 (02-13-25 @ 07:59) (69 - 90)  BP: 123/77 (02-13-25 @ 07:59) (103/64 - 123/77)  RR: 18 (02-13-25 @ 07:59) (18 - 18)  SpO2: 96% (02-13-25 @ 07:59) (96% - 98%)  Wt(kg): --  Daily     Daily     I&O's Summary      PHYSICAL EXAM:  GENERAL: NAD, well-developed  HEAD:  Atraumatic, Normocephalic  EYES: EOMI, PERRLA, conjunctiva and sclera clear  NECK: Supple, No JVD  CHEST/LUNG: Clear to auscultation bilaterally; No wheeze  HEART: Regular rate and rhythm; No murmurs, rubs, or gallops  ABDOMEN: Soft, Nontender, Nondistended; Bowel sounds present  EXTREMITIES:  2+ Peripheral Pulses, No clubbing, cyanosis, +LE edema  PSYCH: AAOx3  NEUROLOGY: non-focal  SKIN: No rashes or lesions    Labs Reviewed  Spoke to patient in regards to abnormal labs.    CBC Full  -  ( 11 Feb 2025 11:54 )  WBC Count : 9.69 K/uL  Hemoglobin : 8.4 g/dL  Hematocrit : 26.2 %  Platelet Count - Automated : 248 K/uL  Mean Cell Volume : 90.0 fL  Mean Cell Hemoglobin : 28.9 pg  Mean Cell Hemoglobin Concentration : 32.1 g/dL  Auto Neutrophil # : x  Auto Lymphocyte # : x  Auto Monocyte # : x  Auto Eosinophil # : x  Auto Basophil # : x  Auto Neutrophil % : x  Auto Lymphocyte % : x  Auto Monocyte % : x  Auto Eosinophil % : x  Auto Basophil % : x    BMP:      Hemoglobin A1c -     Urine Culture:        COVID Labs  CRP:      D-Dimer:        Imaging reviewed independently and reviewed read  < from: Xray Chest 1 View- PORTABLE-Routine (Xray Chest 1 View- PORTABLE-Routine .) (02.07.25 @ 12:42) >      IMPRESSION:    No radiographic evidence of acute cardiopulmonary disease.    < end of copied text >        MEDICATIONS  (STANDING):  aspirin  chewable 81 milliGRAM(s) Oral daily  atorvastatin 20 milliGRAM(s) Oral at bedtime  chlorhexidine 2% Cloths 1 Application(s) Topical daily  dextrose 5%. 1000 milliLiter(s) (100 mL/Hr) IV Continuous <Continuous>  dextrose 5%. 1000 milliLiter(s) (50 mL/Hr) IV Continuous <Continuous>  dextrose 50% Injectable 25 Gram(s) IV Push once  dextrose 50% Injectable 12.5 Gram(s) IV Push once  dextrose 50% Injectable 25 Gram(s) IV Push once  diltiazem    milliGRAM(s) Oral daily  enoxaparin Injectable 40 milliGRAM(s) SubCutaneous every 24 hours  escitalopram 20 milliGRAM(s) Oral daily  ferrous    sulfate 325 milliGRAM(s) Oral two times a day  furosemide    Tablet 20 milliGRAM(s) Oral daily  glucagon  Injectable 1 milliGRAM(s) IntraMuscular once  insulin lispro (ADMELOG) corrective regimen sliding scale   SubCutaneous three times a day before meals  insulin lispro (ADMELOG) corrective regimen sliding scale   SubCutaneous at bedtime  levothyroxine 75 MICROGram(s) Oral daily  mirtazapine 15 milliGRAM(s) Oral at bedtime  pantoprazole    Tablet 40 milliGRAM(s) Oral before breakfast  propranolol 10 milliGRAM(s) Oral two times a day  senna 2 Tablet(s) Oral at bedtime  timolol 0.5% Solution 1 Drop(s) Both EYES two times a day    MEDICATIONS  (PRN):  albuterol    90 MICROgram(s) HFA Inhaler 1 Puff(s) Inhalation four times a day PRN for shortness of breath and/or wheezing  dextrose Oral Gel 15 Gram(s) Oral once PRN Blood Glucose LESS THAN 70 milliGRAM(s)/deciliter  diphenhydrAMINE 25 milliGRAM(s) Oral every 6 hours PRN Rash and/or Itching  melatonin 5 milliGRAM(s) Oral at bedtime PRN Insomnia  ondansetron Injectable 4 milliGRAM(s) IV Push every 4 hours PRN Nausea and/or Vomiting  oxyCODONE    IR 5 milliGRAM(s) Oral every 6 hours PRN Moderate Pain (4 - 6)    Home Medications:  Albuterol (Eqv-Ventolin HFA) 90 mcg/inh inhalation aerosol: 2 puff(s) inhaled 4 times a day as needed for  shortness of breath and/or wheezing (04 Feb 2025 07:31)  Arnuity Ellipta 100 mcg inhalation powder: 1 puff(s) inhaled once a day (04 Feb 2025 07:31)  atorvastatin 20 mg oral tablet: 1 tab(s) orally once a day (at bedtime) (04 Feb 2025 07:31)  busPIRone 10 mg oral tablet: 1 tab(s) orally 2 times a day (04 Feb 2025 07:31)  DilTIAZem (Eqv-Cardizem CD) 120 mg/24 hours oral capsule, extended release: 1 cap(s) orally once a day (04 Feb 2025 07:31)  escitalopram 20 mg oral tablet: 1 tab(s) orally once a day (04 Feb 2025 07:31)  Farxiga 10 mg oral tablet: 1 tab(s) orally once a day (04 Feb 2025 07:31)  levothyroxine 75 mcg (0.075 mg)/mL oral solution: 1 milliliter(s) orally once a day (04 Feb 2025 07:31)  Linzess 145 mcg oral capsule: 1 cap(s) orally as needed for  indigestion (04 Feb 2025 07:31)  memantine 10 mg oral tablet: 1 tab(s) orally 2 times a day (04 Feb 2025 07:31)  mirtazapine 15 mg oral tablet: 1 tab(s) orally once a day (at bedtime) (04 Feb 2025 07:31)  omeprazole 40 mg oral delayed release capsule: 1 cap(s) orally once a day (04 Feb 2025 07:31)  Ozempic 2 mg/1.5 mL (1 mg dose) subcutaneous solution: 1 milligram(s) subcutaneous once a week SATURDAYS (04 Feb 2025 07:31)  propranolol 10 mg oral tablet: 1 tab(s) orally 3 times a day (04 Feb 2025 07:31)  SPARVATO 56MG NOSE SPRAY ONCE A WEEK (THURSDAYS),  VITAMIN B12, VITAMIN D3 1000UNIT, PROBIOTIC,:  (04 Feb 2025 07:31)  Timolol Maleate (Eqv-Timoptic) 0.5% ophthalmic solution: 1 drop(s) in each affected eye 2 times a day (16 Jan 2025 11:52)

## 2025-02-13 NOTE — DISCHARGE NOTE NURSING/CASE MANAGEMENT/SOCIAL WORK - PATIENT PORTAL LINK FT
You can access the FollowMyHealth Patient Portal offered by [facility] by registering at the following website: [email or web address]. By joining [employer]’s FollowMyHealth portal, you will also be able to view your health information using other applications (apps) compatible with our system.

## 2025-02-13 NOTE — PROGRESS NOTE ADULT - ASSESSMENT
ANNETTE HARRIS (74y Female) with PMH of HTN, DM, HLD, chronic back pain, depression, anxiety, essential tremors, GERD, hypothyroidism, h/o rheumatic fever, asthma, constipation. She underwent L4-L5 posterior spinal fusion on 02/04/25. Admitted under orthopedic service for post-op care. Hospital medicine is consulted for comanagement of medical co-morbidities.     # S/p L4-L5 posterior spinal fusion on 02/04/25  - Management as per primary team  - Pain is well controlled   - Completed rochelle-op Ancef.     # Acute blood loss anemia (symptomatic)   - Likely postprocedural. Denies anemia.   - Hb 13.8 > 10.8 > 7.9  - Transfuse if pRBC < 7.0  - Consider IV Venofer 200 mg OD if she remains in-patient     # Hypotension - resolved   - Likely contributed by hypovolemia in setting of blood loss and loose stools.   - No signs of shock.     # h/o Hypertension  - Home meds: Cardizem  mg OD, Lasix 20 mg BID (indication: edema, no h/oo CHF), Propranolol 10 mg BID (indication: tremors), HCTZ, Benazepril 10 mg OD.   - Hold all BP meds   - Resume Propranolol 10 mg BID. Holding parameters (SBP <110, HR <60)    # Essential tremors  - Resume Propranolol 10 mg BID. Holding parameters (SBP <110, HR <60)    # DM  - HbA1c 5.7  - Home meds: Ozempic  - C/w Insulin Sliding Scale for now    # HLD  - Resume home meds: atorvastatin 10 mg     # Hypothyroidism   - Resume home meds: levothyroxine 75 mcg     # H/o rheumatic fever  - Patient is prescribed aspirin 81 mg OD by her cardiologist. No h/o CAD, PAD, CVA. Can resume if no contra-indication.     # Depression, Anxiety, Insomnia   - C/w home meds: mirtazapine, memantine, lexapro   - She does not take Buspirone. Consider stopping it.     # Asthma  - Not in acute exacerbation    # Hypoxia   - Likely due to atelectasias Recommend CXR. Taper O2 as needed.     # Prophylactic measures - as per primary team   - DVT PPx: Lovenox  - GI PPx: pantoprazole   - Diet: carb consistent   - Activity: Brace when OOB/ambulating     Physician Handoff  - CXR. BP. Hb trend.     Spent over 35 min reviewing chart, speaking with patient/family and on coordinating patient care during interdisciplinary rounds.   
pt seen at bedside,  sitting in chair doing well no complaints     Vital Signs Last 24 Hrs  T(C): 36.9 (11 Feb 2025 08:00), Max: 37.6 (11 Feb 2025 00:30)  T(F): 98.4 (11 Feb 2025 08:00), Max: 99.6 (11 Feb 2025 00:30)  HR: 74 (11 Feb 2025 08:00) (70 - 92)  BP: 98/64 (11 Feb 2025 08:00) (93/62 - 131/68)  BP(mean): --  RR: 18 (11 Feb 2025 08:00) (18 - 18)  SpO2: 93% (11 Feb 2025 08:00) (93% - 99%)    P/E:  Alert, NAD  Nonlabored breathing    Dressing c/d/i    B/L LE  5/5 ankle DF PF EHL KE HF  SILT distally  CR<2sec, palpable pulses  Labs              A/P:   73yo Female s/p L4-5 MIS TLIF.     PO pain medication  SCDs and lovenox for DVT ppx  Incentive spirometer bedside  Mobilize with PT OT and Out of bed  LSO when out of bed but does not need brace prior to working with PT  Dispo planning- rehab vs home, awaiting placement will call for peer to peer today   Off BP meds- BP has been stable; if anything on low side; will not re-instate BP meds at this time 
s/p l4-l5 posterior  spinal fusion pod 0   pt seen at bedside   states she has pain lower back but appears to have less lower extremity symptoms which included numb toes preop   feeling well overall     Vital Signs Last 24 Hrs  T(C): 36.3 (04 Feb 2025 17:22), Max: 36.6 (04 Feb 2025 07:42)  T(F): 97.4 (04 Feb 2025 17:22), Max: 97.9 (04 Feb 2025 07:42)  HR: 65 (04 Feb 2025 17:22) (57 - 83)  BP: 117/50 (04 Feb 2025 17:22) (95/52 - 154/73)  BP(mean): --  RR: 19 (04 Feb 2025 17:22) (14 - 22)  SpO2: 100% (04 Feb 2025 17:22) (96% - 100%)                          10.3   11.78 )-----------( 159      ( 04 Feb 2025 15:20 )             31.6       dressing clean and dry   lower extremities motor intact 5/5 df/pf can flex ext b/l knees   sensation intact   venosu calf pumps in place b/l     s/p l4-l5 posterior spinal fusion     pain control on pca transition to po meds tomorrow   needs lso brace   pt rehab   24 hour abx   am labs   dispo planning 
s/p l4-l5 posterior  spinal fusion pod 1  pt seen at bedside   states she has less lower extremity symptoms which included numb toes preop   feeling well overall; pain is well controlled    Vital Signs Last 24 Hrs  T(C): 36.4 (05 Feb 2025 07:55), Max: 36.6 (04 Feb 2025 08:50)  T(F): 97.6 (05 Feb 2025 07:55), Max: 97.7 (04 Feb 2025 21:02)  HR: 67 (05 Feb 2025 07:55) (57 - 83)  BP: 91/56 (05 Feb 2025 07:55) (91/56 - 154/73)  BP(mean): --  RR: 18 (05 Feb 2025 07:55) (14 - 22)  SpO2: 96% (05 Feb 2025 07:55) (96% - 100%)    Parameters below as of 04 Feb 2025 21:02  Patient On (Oxygen Delivery Method): room air                          10.3   11.78 )-----------( 159      ( 04 Feb 2025 15:20 )             31.6       dressing clean and dry   lower extremities motor intact 5/5 df/pf can flex ext b/l knees   sensation intact   venosu calf pumps in place b/l     s/p l4-l5 posterior spinal fusion     pain control on pca transition to po meds tomorrow   needs lso brace   incentive spirometer  pt rehab   24 hour abx   am labs   dispo planning 
ANNETTE HARRIS (74y Female) with PMH of HTN, DM, HLD, chronic back pain, depression, anxiety, essential tremors, GERD, hypothyroidism, h/o rheumatic fever, asthma, constipation. She underwent L4-L5 posterior spinal fusion on 02/04/25. Admitted under orthopedic service for post-op care. Hospital medicine is consulted for comanagement of medical co-morbidities.     # S/p L4-L5 posterior spinal fusion on 02/04/25  - Management as per primary team  - Pain is well controlled   - Completed rochelle-op Ancef.     # Acute blood loss anemia (symptomatic)   - Likely postprocedural. Denies anemia.   - Hb 13.8 > 10.8 > 8.0  - Transfuse if pRBC < 7.0  - C/w IV Venofer 200 mg once daily if she remains in-patient   - On discharge, prescribe her PO FeSO4 twice daily     # Hypotension - resolved   - Likely contributed by hypovolemia in setting of blood loss and loose stools.   - No signs of shock.     # h/o Hypertension  - Home meds: Cardizem  mg OD, Lasix 20 mg BID (indication: edema, no h/oo CHF), Propranolol 10 mg BID (indication: tremors), HCTZ, Benazepril 10 mg OD.   - C/w Cardizem and Propranolol Holding parameters (SBP <110, HR <60).  - Hold Lasix, HCTZ and Benazepril both during in-patient and even on discharge.     # Essential tremors  - C/w Propranolol 10 mg BID. Holding parameters (SBP <110, HR <60)    # DM  - HbA1c 5.7  - Home meds: Ozempic  - C/w Insulin Sliding Scale for now  - Resume Ozempic on discharge     # HLD  - Resume home meds: atorvastatin 10 mg     # Hypothyroidism   - Resume home meds: levothyroxine 75 mcg     # H/o rheumatic fever  - Patient is prescribed aspirin 81 mg OD by her cardiologist. No h/o CAD, PAD, CVA.    # Depression, Anxiety, Insomnia   - C/w home meds: mirtazapine, lexapro     # Asthma  - Not in acute exacerbation    # Hypoxia - resolved   - Likely due to atelectasias. CXR normal.     # Prophylactic measures - as per primary team   - DVT PPx: Lovenox  - GI PPx: pantoprazole   - Diet: carb consistent   - Activity: Brace when OOB/ambulating     Physician Handoff  - MEDICINE WILL SIGN OFF. Please re-consult if needed.     Spent over 35 min reviewing chart, speaking with patient/family and on coordinating patient care during interdisciplinary rounds.   
ANNETTE HARRIS (74y Female) with PMH of HTN, DM, HLD, chronic back pain, depression, anxiety, essential tremors, GERD, hypothyroidism, h/o rheumatic fever, asthma, constipation. She underwent L4-L5 posterior spinal fusion on 02/04/25. Admitted under orthopedic service for post-op care. Hospital medicine is consulted for comanagement of medical co-morbidities.     # S/p L4-L5 posterior spinal fusion on 02/04/25  - Management as per primary team  - Pain is well controlled   - Completed rochelle-op Ancef.     # Acute blood loss anemia (symptomatic)   - Likely postprocedural. Denies anemia.   - Hb 13.8 > 10.8 > 8.0  - Transfuse if pRBC < 7.0  - Consider IV Venofer 200 mg OD if she remains in-patient     # Hypotension - resolved   - Likely contributed by hypovolemia in setting of blood loss and loose stools.   - No signs of shock.     # h/o Hypertension  - Home meds: Cardizem  mg OD, Lasix 20 mg BID (indication: edema, no h/oo CHF), Propranolol 10 mg BID (indication: tremors), HCTZ, Benazepril 10 mg OD.   - Resume Cardizem and Propranolol Holding parameters (SBP <110, HR <60).  - Hold rest of BP meds.     # Essential tremors  - Resume Propranolol 10 mg BID. Holding parameters (SBP <110, HR <60)    # DM  - HbA1c 5.7  - Home meds: Ozempic  - C/w Insulin Sliding Scale for now    # HLD  - Resume home meds: atorvastatin 10 mg     # Hypothyroidism   - Resume home meds: levothyroxine 75 mcg     # H/o rheumatic fever  - Patient is prescribed aspirin 81 mg OD by her cardiologist. No h/o CAD, PAD, CVA. Can resume aspirin 81 mg if no contra-indication.     # Depression, Anxiety, Insomnia   - C/w home meds: mirtazapine, lexapro     # Asthma  - Not in acute exacerbation    # Hypoxia - resolved   - Likely due to atelectasias. CXR normal.     # Prophylactic measures - as per primary team   - DVT PPx: Lovenox  - GI PPx: pantoprazole   - Diet: carb consistent   - Activity: Brace when OOB/ambulating     Physician Handoff  - CXR. BP. Hb trend.     Spent over 35 min reviewing chart, speaking with patient/family and on coordinating patient care during interdisciplinary rounds.   
ANNETTE HARRIS (74y Female) with PMH of HTN, DM, HLD, chronic back pain, depression, anxiety, essential tremors, GERD, hypothyroidism, h/o rheumatic fever, asthma, constipation. She underwent L4-L5 posterior spinal fusion on 02/04/25. Admitted under orthopedic service for post-op care. Hospital medicine is consulted for comanagement of medical co-morbidities.     # S/p L4-L5 posterior spinal fusion on 02/04/25  - Management as per primary team  - Pain is well controlled   - Completed rochelle-op Ancef.     # Acute blood loss anemia (symptomatic)   - Likely postprocedural. Denies anemia.   - Hb 13.8 > 10.8 > 8.0  - Transfuse if pRBC < 7.0  - continue PO FeSO4 twice daily     # Hypotension - resolved   - Likely contributed by hypovolemia in setting of blood loss and loose stools.   - No signs of shock.     # h/o Hypertension  - Home meds: Cardizem  mg OD, Lasix 20 mg BID (indication: edema, no h/oo CHF), Propranolol 10 mg BID (indication: tremors), HCTZ, Benazepril 10 mg OD.   - C/w Cardizem and Propranolol Holding parameters (SBP <110, HR <60).  - Hold HCTZ and Benazepril both during in-patient and even on discharge.   - restart Lasix today 20 mg daily   - monitor BP if stable 24 hrs, restart farxiga   - follow up BCP to restart HCTZ and Benzipril, if pt remains in pt, can monitor BP and if remains stable can continue to reintroduce back home meds.     # Essential tremors  - C/w Propranolol 10 mg BID. Holding parameters (SBP <110, HR <60)    # DM  - HbA1c 5.7  - Home meds: Ozempic  - C/w Insulin Sliding Scale for now  - Resume Ozempic on discharge     # HLD  - Resume home meds: atorvastatin 10 mg     # Hypothyroidism   - Resume home meds: levothyroxine 75 mcg     # H/o rheumatic fever  - Patient is prescribed aspirin 81 mg OD by her cardiologist. No h/o CAD, PAD, CVA.    # Depression, Anxiety, Insomnia   - C/w home meds: mirtazapine, lexapro     # Asthma  - Not in acute exacerbation    # Hypoxia - resolved   - Likely due to atelectasias. CXR normal.     # Prophylactic measures - as per primary team   - DVT PPx: Lovenox  - GI PPx: pantoprazole   - Diet: carb consistent   - Activity: Brace when OOB/ambulating

## 2025-02-13 NOTE — PROGRESS NOTE ADULT - SUBJECTIVE AND OBJECTIVE BOX
75yo Female s/p L4-5 MIS TLIF, pod 8    Pt seen at bedside, comfortable in bed    MEDICATIONS  (STANDING):  aspirin  chewable 81 milliGRAM(s) Oral daily  atorvastatin 20 milliGRAM(s) Oral at bedtime  dextrose 5%. 1000 milliLiter(s) (50 mL/Hr) IV Continuous <Continuous>  dextrose 5%. 1000 milliLiter(s) (100 mL/Hr) IV Continuous <Continuous>  dextrose 50% Injectable 25 Gram(s) IV Push once  dextrose 50% Injectable 12.5 Gram(s) IV Push once  dextrose 50% Injectable 25 Gram(s) IV Push once  diltiazem    milliGRAM(s) Oral daily  enoxaparin Injectable 40 milliGRAM(s) SubCutaneous every 24 hours  escitalopram 20 milliGRAM(s) Oral daily  ferrous    sulfate 325 milliGRAM(s) Oral two times a day  glucagon  Injectable 1 milliGRAM(s) IntraMuscular once  insulin lispro (ADMELOG) corrective regimen sliding scale   SubCutaneous three times a day before meals  insulin lispro (ADMELOG) corrective regimen sliding scale   SubCutaneous at bedtime  levothyroxine 75 MICROGram(s) Oral daily  mirtazapine 15 milliGRAM(s) Oral at bedtime  pantoprazole    Tablet 40 milliGRAM(s) Oral before breakfast  propranolol 10 milliGRAM(s) Oral two times a day  senna 2 Tablet(s) Oral at bedtime  timolol 0.5% Solution 1 Drop(s) Both EYES two times a day    MEDICATIONS  (PRN):  albuterol    90 MICROgram(s) HFA Inhaler 1 Puff(s) Inhalation four times a day PRN for shortness of breath and/or wheezing  dextrose Oral Gel 15 Gram(s) Oral once PRN Blood Glucose LESS THAN 70 milliGRAM(s)/deciliter  diphenhydrAMINE 25 milliGRAM(s) Oral every 6 hours PRN Rash and/or Itching  melatonin 5 milliGRAM(s) Oral at bedtime PRN Insomnia  ondansetron Injectable 4 milliGRAM(s) IV Push every 4 hours PRN Nausea and/or Vomiting  oxyCODONE    IR 5 milliGRAM(s) Oral every 6 hours PRN Moderate Pain (4 - 6)    NAD    Vital Signs Last 24 Hrs  T(C): 36.9 (12 Feb 2025 00:27), Max: 36.9 (11 Feb 2025 16:07)  T(F): 98.4 (12 Feb 2025 00:27), Max: 98.4 (11 Feb 2025 16:07)  HR: 80 (12 Feb 2025 00:27) (63 - 80)  BP: 103/70 (12 Feb 2025 00:27) (102/56 - 103/70)  BP(mean): --  RR: 18 (12 Feb 2025 00:27) (18 - 18)  SpO2: 95% (12 Feb 2025 00:27) (95% - 100%)      PE:    Dressing c/d/i  B/L LE  5/5 ankle DF PF EHL KE HF  SILT distally  CR<2sec, palpable pulses                            8.4    9.69  )-----------( 248      ( 11 Feb 2025 11:54 )             26.2                     A/P:   75yo Female s/p L4-5 MIS TLIF, pod 9    pain control-PO pain medication  SCDs and lovenox for DVT ppx  Incentive spirometer bedside  Mobilize with PT OT and Out of bed  LSO when out of bed but does not need brace prior to working with PT  Dispo planning- rehab vs home, awaiting placement   Off BP meds- BP has been stable; if anything on low side; will not re-instate BP meds at this time

## 2025-02-19 ENCOUNTER — OUTPATIENT (OUTPATIENT)
Dept: OUTPATIENT SERVICES | Facility: HOSPITAL | Age: 75
LOS: 1 days | End: 2025-02-19
Payer: MEDICARE

## 2025-02-19 ENCOUNTER — APPOINTMENT (OUTPATIENT)
Dept: ENDOCRINOLOGY | Facility: CLINIC | Age: 75
End: 2025-02-19

## 2025-02-19 VITALS
BODY MASS INDEX: 34.96 KG/M2 | DIASTOLIC BLOOD PRESSURE: 72 MMHG | HEART RATE: 66 BPM | SYSTOLIC BLOOD PRESSURE: 116 MMHG | WEIGHT: 179 LBS

## 2025-02-19 DIAGNOSIS — E11.65 TYPE 2 DIABETES MELLITUS WITH HYPERGLYCEMIA: ICD-10-CM

## 2025-02-19 DIAGNOSIS — Z98.890 OTHER SPECIFIED POSTPROCEDURAL STATES: Chronic | ICD-10-CM

## 2025-02-19 DIAGNOSIS — Z00.00 ENCOUNTER FOR GENERAL ADULT MEDICAL EXAMINATION WITHOUT ABNORMAL FINDINGS: ICD-10-CM

## 2025-02-19 PROBLEM — R32 UNSPECIFIED URINARY INCONTINENCE: Chronic | Status: ACTIVE | Noted: 2025-02-04

## 2025-02-19 PROCEDURE — 99214 OFFICE O/P EST MOD 30 MIN: CPT

## 2025-02-20 PROBLEM — K21.9 GASTRO-ESOPHAGEAL REFLUX DISEASE WITHOUT ESOPHAGITIS: Chronic | Status: ACTIVE | Noted: 2025-02-04

## 2025-02-20 PROBLEM — M54.30 SCIATICA, UNSPECIFIED SIDE: Chronic | Status: ACTIVE | Noted: 2025-02-04

## 2025-02-21 ENCOUNTER — APPOINTMENT (OUTPATIENT)
Dept: ORTHOPEDIC SURGERY | Facility: CLINIC | Age: 75
End: 2025-02-21
Payer: MEDICARE

## 2025-02-21 ENCOUNTER — RX RENEWAL (OUTPATIENT)
Age: 75
End: 2025-02-21

## 2025-02-21 ENCOUNTER — RESULT CHARGE (OUTPATIENT)
Age: 75
End: 2025-02-21

## 2025-02-21 DIAGNOSIS — Z79.890 HORMONE REPLACEMENT THERAPY: ICD-10-CM

## 2025-02-21 DIAGNOSIS — Z79.85 LONG-TERM (CURRENT) USE OF INJECTABLE NON-INSULIN ANTIDIABETIC DRUGS: ICD-10-CM

## 2025-02-21 DIAGNOSIS — E78.00 PURE HYPERCHOLESTEROLEMIA, UNSPECIFIED: ICD-10-CM

## 2025-02-21 DIAGNOSIS — I95.89 OTHER HYPOTENSION: ICD-10-CM

## 2025-02-21 DIAGNOSIS — Z79.51 LONG TERM (CURRENT) USE OF INHALED STEROIDS: ICD-10-CM

## 2025-02-21 DIAGNOSIS — M43.16 SPONDYLOLISTHESIS, LUMBAR REGION: ICD-10-CM

## 2025-02-21 DIAGNOSIS — R09.02 HYPOXEMIA: ICD-10-CM

## 2025-02-21 DIAGNOSIS — I10 ESSENTIAL (PRIMARY) HYPERTENSION: ICD-10-CM

## 2025-02-21 DIAGNOSIS — E86.1 HYPOVOLEMIA: ICD-10-CM

## 2025-02-21 DIAGNOSIS — M54.16 RADICULOPATHY, LUMBAR REGION: ICD-10-CM

## 2025-02-21 DIAGNOSIS — M53.2X6 SPINAL INSTABILITIES, LUMBAR REGION: ICD-10-CM

## 2025-02-21 DIAGNOSIS — F32.A DEPRESSION, UNSPECIFIED: ICD-10-CM

## 2025-02-21 DIAGNOSIS — G47.00 INSOMNIA, UNSPECIFIED: ICD-10-CM

## 2025-02-21 DIAGNOSIS — D62 ACUTE POSTHEMORRHAGIC ANEMIA: ICD-10-CM

## 2025-02-21 DIAGNOSIS — M48.062 SPINAL STENOSIS, LUMBAR REGION WITH NEUROGENIC CLAUDICATION: ICD-10-CM

## 2025-02-21 DIAGNOSIS — J45.909 UNSPECIFIED ASTHMA, UNCOMPLICATED: ICD-10-CM

## 2025-02-21 DIAGNOSIS — R32 UNSPECIFIED URINARY INCONTINENCE: ICD-10-CM

## 2025-02-21 DIAGNOSIS — M81.0 AGE-RELATED OSTEOPOROSIS WITHOUT CURRENT PATHOLOGICAL FRACTURE: ICD-10-CM

## 2025-02-21 DIAGNOSIS — Z79.84 LONG TERM (CURRENT) USE OF ORAL HYPOGLYCEMIC DRUGS: ICD-10-CM

## 2025-02-21 DIAGNOSIS — E11.9 TYPE 2 DIABETES MELLITUS WITHOUT COMPLICATIONS: ICD-10-CM

## 2025-02-21 DIAGNOSIS — M48.061 SPINAL STENOSIS, LUMBAR REGION WITHOUT NEUROGENIC CLAUDICATION: ICD-10-CM

## 2025-02-21 DIAGNOSIS — E66.9 OBESITY, UNSPECIFIED: ICD-10-CM

## 2025-02-21 DIAGNOSIS — G25.0 ESSENTIAL TREMOR: ICD-10-CM

## 2025-02-21 DIAGNOSIS — E03.9 HYPOTHYROIDISM, UNSPECIFIED: ICD-10-CM

## 2025-02-21 DIAGNOSIS — Z79.82 LONG TERM (CURRENT) USE OF ASPIRIN: ICD-10-CM

## 2025-02-21 DIAGNOSIS — K21.9 GASTRO-ESOPHAGEAL REFLUX DISEASE WITHOUT ESOPHAGITIS: ICD-10-CM

## 2025-02-21 PROCEDURE — 72100 X-RAY EXAM L-S SPINE 2/3 VWS: CPT

## 2025-02-21 PROCEDURE — 99024 POSTOP FOLLOW-UP VISIT: CPT

## 2025-03-04 DIAGNOSIS — E66.09 OTHER OBESITY DUE TO EXCESS CALORIES: ICD-10-CM

## 2025-03-04 DIAGNOSIS — E11.65 TYPE 2 DIABETES MELLITUS WITH HYPERGLYCEMIA: ICD-10-CM

## 2025-03-04 DIAGNOSIS — E03.9 HYPOTHYROIDISM, UNSPECIFIED: ICD-10-CM

## 2025-03-21 ENCOUNTER — APPOINTMENT (OUTPATIENT)
Dept: ORTHOPEDIC SURGERY | Facility: CLINIC | Age: 75
End: 2025-03-21
Payer: MEDICARE

## 2025-03-21 DIAGNOSIS — M48.062 SPINAL STENOSIS, LUMBAR REGION WITH NEUROGENIC CLAUDICATION: ICD-10-CM

## 2025-03-21 PROCEDURE — 72100 X-RAY EXAM L-S SPINE 2/3 VWS: CPT

## 2025-03-21 PROCEDURE — 99024 POSTOP FOLLOW-UP VISIT: CPT

## 2025-03-24 ENCOUNTER — EMERGENCY (EMERGENCY)
Facility: HOSPITAL | Age: 75
LOS: 0 days | Discharge: HOME | End: 2025-03-24
Attending: EMERGENCY MEDICINE
Payer: MEDICARE

## 2025-03-24 VITALS
SYSTOLIC BLOOD PRESSURE: 144 MMHG | RESPIRATION RATE: 18 BRPM | HEART RATE: 54 BPM | HEIGHT: 58 IN | TEMPERATURE: 98 F | DIASTOLIC BLOOD PRESSURE: 56 MMHG | WEIGHT: 182.98 LBS

## 2025-03-24 VITALS
HEART RATE: 56 BPM | RESPIRATION RATE: 18 BRPM | SYSTOLIC BLOOD PRESSURE: 131 MMHG | OXYGEN SATURATION: 97 % | TEMPERATURE: 98 F | DIASTOLIC BLOOD PRESSURE: 50 MMHG

## 2025-03-24 DIAGNOSIS — I10 ESSENTIAL (PRIMARY) HYPERTENSION: ICD-10-CM

## 2025-03-24 DIAGNOSIS — M54.50 LOW BACK PAIN, UNSPECIFIED: ICD-10-CM

## 2025-03-24 DIAGNOSIS — Z98.890 OTHER SPECIFIED POSTPROCEDURAL STATES: Chronic | ICD-10-CM

## 2025-03-24 DIAGNOSIS — Z91.018 ALLERGY TO OTHER FOODS: ICD-10-CM

## 2025-03-24 DIAGNOSIS — J45.909 UNSPECIFIED ASTHMA, UNCOMPLICATED: ICD-10-CM

## 2025-03-24 DIAGNOSIS — R29.898 OTHER SYMPTOMS AND SIGNS INVOLVING THE MUSCULOSKELETAL SYSTEM: ICD-10-CM

## 2025-03-24 DIAGNOSIS — E11.9 TYPE 2 DIABETES MELLITUS WITHOUT COMPLICATIONS: ICD-10-CM

## 2025-03-24 DIAGNOSIS — E78.5 HYPERLIPIDEMIA, UNSPECIFIED: ICD-10-CM

## 2025-03-24 LAB
ALBUMIN SERPL ELPH-MCNC: 3.7 G/DL — SIGNIFICANT CHANGE UP (ref 3.5–5.2)
ALP SERPL-CCNC: 138 U/L — HIGH (ref 30–115)
ALT FLD-CCNC: 10 U/L — SIGNIFICANT CHANGE UP (ref 0–41)
ANION GAP SERPL CALC-SCNC: 9 MMOL/L — SIGNIFICANT CHANGE UP (ref 7–14)
AST SERPL-CCNC: 15 U/L — SIGNIFICANT CHANGE UP (ref 0–41)
BILIRUB SERPL-MCNC: 0.3 MG/DL — SIGNIFICANT CHANGE UP (ref 0.2–1.2)
BUN SERPL-MCNC: 14 MG/DL — SIGNIFICANT CHANGE UP (ref 10–20)
CALCIUM SERPL-MCNC: 9.2 MG/DL — SIGNIFICANT CHANGE UP (ref 8.4–10.5)
CHLORIDE SERPL-SCNC: 106 MMOL/L — SIGNIFICANT CHANGE UP (ref 98–110)
CO2 SERPL-SCNC: 29 MMOL/L — SIGNIFICANT CHANGE UP (ref 17–32)
CREAT SERPL-MCNC: 0.9 MG/DL — SIGNIFICANT CHANGE UP (ref 0.7–1.5)
EGFR: 68 ML/MIN/1.73M2 — SIGNIFICANT CHANGE UP
EGFR: 68 ML/MIN/1.73M2 — SIGNIFICANT CHANGE UP
GLUCOSE SERPL-MCNC: 90 MG/DL — SIGNIFICANT CHANGE UP (ref 70–99)
HCT VFR BLD CALC: 37.9 % — SIGNIFICANT CHANGE UP (ref 37–47)
HGB BLD-MCNC: 11.9 G/DL — LOW (ref 12–16)
MCHC RBC-ENTMCNC: 28.1 PG — SIGNIFICANT CHANGE UP (ref 27–31)
MCHC RBC-ENTMCNC: 31.4 G/DL — LOW (ref 32–37)
MCV RBC AUTO: 89.6 FL — SIGNIFICANT CHANGE UP (ref 81–99)
NRBC BLD AUTO-RTO: 0 /100 WBCS — SIGNIFICANT CHANGE UP (ref 0–0)
PLATELET # BLD AUTO: 250 K/UL — SIGNIFICANT CHANGE UP (ref 130–400)
PMV BLD: 12.6 FL — HIGH (ref 7.4–10.4)
POTASSIUM SERPL-MCNC: 4.4 MMOL/L — SIGNIFICANT CHANGE UP (ref 3.5–5)
POTASSIUM SERPL-SCNC: 4.4 MMOL/L — SIGNIFICANT CHANGE UP (ref 3.5–5)
PROT SERPL-MCNC: 6.6 G/DL — SIGNIFICANT CHANGE UP (ref 6–8)
RBC # BLD: 4.23 M/UL — SIGNIFICANT CHANGE UP (ref 4.2–5.4)
RBC # FLD: 15.6 % — HIGH (ref 11.5–14.5)
SODIUM SERPL-SCNC: 144 MMOL/L — SIGNIFICANT CHANGE UP (ref 135–146)
WBC # BLD: 8.83 K/UL — SIGNIFICANT CHANGE UP (ref 4.8–10.8)
WBC # FLD AUTO: 8.83 K/UL — SIGNIFICANT CHANGE UP (ref 4.8–10.8)

## 2025-03-24 PROCEDURE — 85027 COMPLETE CBC AUTOMATED: CPT

## 2025-03-24 PROCEDURE — 99285 EMERGENCY DEPT VISIT HI MDM: CPT

## 2025-03-24 PROCEDURE — 36415 COLL VENOUS BLD VENIPUNCTURE: CPT

## 2025-03-24 PROCEDURE — 99284 EMERGENCY DEPT VISIT MOD MDM: CPT | Mod: 25

## 2025-03-24 PROCEDURE — 99282 EMERGENCY DEPT VISIT SF MDM: CPT | Mod: FS

## 2025-03-24 PROCEDURE — 96374 THER/PROPH/DIAG INJ IV PUSH: CPT | Mod: XU

## 2025-03-24 PROCEDURE — 72132 CT LUMBAR SPINE W/DYE: CPT | Mod: MC

## 2025-03-24 PROCEDURE — 72132 CT LUMBAR SPINE W/DYE: CPT | Mod: 26

## 2025-03-24 PROCEDURE — 80053 COMPREHEN METABOLIC PANEL: CPT

## 2025-03-24 RX ORDER — METHYLPREDNISOLONE ACETATE 80 MG/ML
1 INJECTION, SUSPENSION INTRA-ARTICULAR; INTRALESIONAL; INTRAMUSCULAR; SOFT TISSUE
Qty: 1 | Refills: 0
Start: 2025-03-24 | End: 2025-03-29

## 2025-03-24 RX ORDER — KETOROLAC TROMETHAMINE 30 MG/ML
15 INJECTION, SOLUTION INTRAMUSCULAR; INTRAVENOUS ONCE
Refills: 0 | Status: COMPLETED | OUTPATIENT
Start: 2025-03-24 | End: 2025-03-24

## 2025-03-24 RX ADMIN — Medication 6 MILLIGRAM(S): at 15:09

## 2025-03-24 NOTE — ED ADULT NURSE NOTE - NSFALLUNIVINTERV_ED_ALL_ED
Bed/Stretcher in lowest position, wheels locked, appropriate side rails in place/Call bell, personal items and telephone in reach/Instruct patient to call for assistance before getting out of bed/chair/stretcher/Non-slip footwear applied when patient is off stretcher/[place] to call system/Physically safe environment - no spills, clutter or unnecessary equipment/Purposeful proactive rounding/Room/bathroom lighting operational, light cord in reach

## 2025-03-24 NOTE — ED ADULT NURSE NOTE - PAIN RATING/NUMBER SCALE (0-10): ACTIVITY
Wt Readings from Last 3 Encounters:   07/06/22 89 8 kg (197 lb 15 6 oz)   05/26/22 89 3 kg (196 lb 13 9 oz)   02/25/22 94 4 kg (208 lb 3 2 oz)     · Mildly volume overloaded   · BNP 1062  · Home regimen: 40 mg PO daily   · Prior ECHO 05/22: EF 03%, grade 2 diastolic dysfunction   · Repeat Echo (7/5): EF 23%, grade 2 diastolic dysfunction  · S/p 40 mg IV lasix in the ED  C/w 40 mg IV BID  · Transitioned to oral diuresis per 4/6  · Monitor I/O and daily weight   · Continue low NA diet and Fluid restriction   · Telemetry reviewed: NSR with HR 80's  Patient with runs of Atrial tachycardia overnight  · Continue to monitor overnight  · Cardiology consulted, appreciate recommendations:  · Found to have drop in EF with regional wall motion abnormalities  Likely representing ischemic cardiomyopathy  · Conservative measures were recommended, and patient did not want to pursue cardiac cath  Continue with medical therapy  Plan to discontinue Lisinopril to up titrate anti-anginal's and beta-blockers if need be  4 (moderate pain)

## 2025-03-24 NOTE — ED PROVIDER NOTE - OBJECTIVE STATEMENT
Patient is a 74-year-old female with a past medical history of hypertension, diabetes, asthma, hyperlipidemia, L4-L5 lumbar fusion in February Presenting for evaluation of back pain. patients states she was on an ambulette Friday that was “bumpy" and afterwards began to have worsening pain in her back radiating down her right leg. She states she is now having weakness in her right leg and trouble with ambulation. denies fever, chills, chest pain, sob, abdominal pain. patient was seen by her surgeon on Friday who  but symptoms were not present at the time.

## 2025-03-24 NOTE — CONSULT NOTE ADULT - ASSESSMENT
s/p L4-l5 tlif January 2025 doing well post op presents acute onset of right lower back pain     pain control, nsaids if no contraindications   ct scan lumbar region to assess pathology   will review case with dr jorje sebastian

## 2025-03-24 NOTE — ED ADULT TRIAGE NOTE - CHIEF COMPLAINT QUOTE
pt brought in by ems from [place] for lower back pain since friday.  pt states have been having difficultly ambulating , usually walks with cane.

## 2025-03-24 NOTE — ED ADULT NURSE NOTE - CHIEF COMPLAINT QUOTE
pt brought in by ems from [place] for lower back pain since friday.  pt states have been having difficultly ambulating , usually walks with cane.

## 2025-03-24 NOTE — ED PROVIDER NOTE - PATIENT PORTAL LINK FT
You can access the FollowMyHealth Patient Portal offered by [facility] by registering at the following website: [email or web address]. By joining [employer]’s FollowMyHealth portal, you will also be able to view your health information using other applications (apps) compatible with our system.

## 2025-03-24 NOTE — ED PROVIDER NOTE - ATTENDING CONTRIBUTION TO CARE
Patient is a 74-year-old female with a past medical history of hypertension, diabetes, asthma, hyperlipidemia, L4-L5 lumbar fusion in February.  The patient was seen and examined with the resident.  The patient went on an ambulette ride Friday after visiting her spine surgeon.  During the ambulance ride it was extremely bumpy and then she developed lumbar spinal pain that radiates down her right leg.  Later she started to experience right leg weakness and now she is having trouble with ambulation    On our exam patient is alert and oriented x 3 no acute distress.  Neuro exam is nonfocal.  No signs or symptoms of cauda equina syndrome.    We spoke to her spinal surgeon who is recommending orthopedic evaluation.  Awaiting orthopedic evaluation

## 2025-03-24 NOTE — ED PROVIDER NOTE - PROGRESS NOTE DETAILS
Received a phone call from Dr. Puga who states he reviewed the CT scan and does not feel any thing needs to be done emergently.  He states he will see the patient this week in his clinic.  Per Ortho team patient has no objective weakness on exam, pain limited to right upper buttock.  Plan for patient to be discharged with Medrol Dosepak and follow-up next week. Discussed patient clinical presentation and symptoms with Dr. Puga who did the procedure here in February.  He states he saw the patient on Friday patient had no symptoms at that time.  He would like our orthopedic team to evaluate the patient as well as CT of the lumbar spine, pain control.

## 2025-03-24 NOTE — CONSULT NOTE ADULT - SUBJECTIVE AND OBJECTIVE BOX
Imaging Studies, Medications  Patient is a 74-year-old female with a past medical history of hypertension, diabetes, asthma, hyperlipidemia, L4-L5 lumbar fusion in February.  The patient was seen and examined with the resident.  The patient went on an ambulette ride Friday after visiting her spine surgeon.  During the ambulance ride it was extremely bumpy and then she developed lumbar spinal pain that radiates down her right leg.  Later she started to experience right leg weakness and now she is having trouble with ambulation    pt seen at bedside, denies radiculopathy down leg.   pain is isolated to the right buttock region.    denies known trauma, denies fecal or urinary issues denies fever       Vital Signs Last 24 Hrs  T(C): 36.6 (24 Mar 2025 16:00), Max: 36.8 (24 Mar 2025 12:38)  T(F): 97.9 (24 Mar 2025 16:00), Max: 98.2 (24 Mar 2025 12:38)  HR: 56 (24 Mar 2025 16:00) (54 - 56)  BP: 131/50 (24 Mar 2025 16:00) (131/50 - 144/56)  BP(mean): --  RR: 18 (24 Mar 2025 16:00) (18 - 18)  SpO2: 97% (24 Mar 2025 16:00) (97% - 97%)                          11.9   8.83  )-----------( 250      ( 24 Mar 2025 15:03 )             37.9       pe:   healed incision of back no swelling or erythema,    right lower extremity can straight leg raise off bed,   no weakness noted at foot df/pf 5/5 sensation between toes intact,   ehl 5/5   knee flexion and extension 5/5 and equal to left   hip flexion assessed 5/5 strength and equal to the left   i was able to stand pt and had were weight bear three steps   sensation fully intact   reproduction of pain with axial loading   no deficits found on pe

## 2025-03-24 NOTE — ED PROVIDER NOTE - ED STEMI HIDDEN
SAVANNAH Bathing:  SAVANNAH Upper Body Dressing:  SAVANNAH Lower Body Dressing:  SAVANNAH SAVANNAH Bed/Chair/Wheelchair Transfer:  SAVANNAH SAVANNAH Walk/Wheelchair:  SAVANNAH Stairs:    SAVANNAH Comprehension:  SAVANNAH Expression:  SAVANNAH Social Interaction:  SAVANNAH Problem Solving:  SAVANNAH Memory:    Therapy Mode Minutes  Occupational Therapy: Individual: 85  minutes.  Physical Therapy:  Speech Language Pathology:    Discharge Functional Goals:    Signed by: Grace Cuevas, Occupational Therapist     hide

## 2025-03-24 NOTE — ED PROVIDER NOTE - PHYSICAL EXAMINATION
VITAL SIGNS: I have reviewed nursing notes and confirm.  CONSTITUTIONAL: Well-developed; well-nourished; in no acute distress.  SKIN: Skin exam is warm and dry, no acute rash.  HEAD: Normocephalic; atraumatic.  EYES: PERRL, EOM intact; conjunctiva and sclera clear.  ENT: No nasal discharge; airway clear. TMs clear.  NECK: Supple; non tender.  CARD: S1, S2 normal; no murmurs, gallops, or rubs. Regular rate and rhythm.  RESP: Normal respiratory effort, no tachypnea or distress. Lungs CTAB, no wheezes, rales or rhonchi.  ABD: soft, NT/ND.  BACK: Right lumbar paraspinal TTP.  EXT: Normal ROM. No clubbing, cyanosis or edema.   LYMPH: No acute cervical adenopathy.  NEURO: Alert, oriented. Grossly unremarkable. No focal deficits.  PSYCH: Cooperative, appropriate.

## 2025-03-24 NOTE — ED PROVIDER NOTE - NSFOLLOWUPINSTRUCTIONS_ED_ALL_ED_FT
Dr. Puga's office will reach out to you to set up an appointment this week.     Acute Low Back Pain    WHAT YOU NEED TO KNOW:    What is acute low back pain? Acute low back pain is sudden discomfort that lasts up to 6 weeks and makes activity difficult.    What causes or increases my risk for acute low back pain? Conditions that affect the spine, joints, or muscles can cause back pain. These may include arthritis, spinal stenosis (narrowing of the spinal column), muscle tension, or breakdown of the spinal discs. The following increase your risk for back pain:    Repeated bending, lifting, or twisting, or lifting heavy items    Injury from a fall or accident    Lack of regular physical activity    Obesity or pregnancy    Smoking    Aging    Driving, sitting, or standing for long periods    Bad posture while sitting or standing  How is the cause of acute low back pain diagnosed? Your healthcare provider will ask about your medical history and examine you. He or she may ask when you last had low back pain and how it started. Show him or her where you feel the pain and what makes it better or worse. Tell your provider about the type of pain you have, how bad it is, and how long it lasts. Tell him or her if your pain worsens at night or when you lie on your back.  Pain Scale     How is acute low back pain treated? The goal of treatment is to relieve your pain and help you be able to do your regular activities. Most people with acute low back pain get better within 4 to 6 weeks. You may need any of the following:    NSAIDs, such as ibuprofen, help decrease swelling, pain, and fever. This medicine is available with or without a doctor's order. NSAIDs can cause stomach bleeding or kidney problems in certain people. If you take blood thinner medicine, always ask your healthcare provider if NSAIDs are safe for you. Always read the medicine label and follow directions.    Acetaminophen decreases pain and fever. It is available without a doctor's order. Ask how much to take and how often to take it. Follow directions. Read the labels of all other medicines you are using to see if they also contain acetaminophen, or ask your doctor or pharmacist. Acetaminophen can cause liver damage if not taken correctly.    Muscle relaxers decrease pain by relaxing the muscles in your lower spine.    Prescription pain medicine may be given. Ask your healthcare provider how to take this medicine safely. Some prescription pain medicines contain acetaminophen. Do not take other medicines that contain acetaminophen without talking to your healthcare provider. Too much acetaminophen may cause liver damage. Prescription pain medicine may cause constipation. Ask your healthcare provider how to prevent or treat constipation.  What can I do to prevent low back pain?    Use proper body mechanics.  Bend at the hips and knees when you  objects. Do not bend from the waist. Use your leg muscles as you lift the load. Do not use your back. Keep the object close to your chest as you lift it. Try not to twist or lift anything above your waist.  How to Lift Items Safely      Change your position often when you stand for long periods of time. Rest one foot on a small box or footrest, and then switch to the other foot often.    Try not to sit for long periods of time. When you do, sit in a straight-backed chair with your feet flat on the floor. Never reach, pull, or push while you are sitting.    Do exercises that strengthen your back muscles. Warm up before you exercise. Ask your healthcare provider for the best exercises for you.  Warm up and Cool Down       Maintain a healthy weight. Ask your healthcare provider what a healthy weight is for you. Ask him or her to help you create a weight loss plan if you are overweight.  How can I take care of myself if I have acute low back pain?    Stay active as much as you can without causing more pain. Bed rest could make your back pain worse. Start with some light exercises, such as walking. Avoid heavy lifting until your pain is gone. Ask for more information about the activities or exercises that are right for you.   FAMILY WALKING FOR EXERCISE      Apply heat on your back for 20 to 30 minutes every 2 hours for as many days as directed. Heat helps decrease pain and muscle spasms. Alternate heat and ice.    Apply ice on your back for 15 to 20 minutes every hour or as directed. Use an ice pack, or put crushed ice in a plastic bag. Cover it with a towel before you apply it to your skin. Ice helps prevent tissue damage and decreases swelling and pain.  When should I seek immediate care?    You have severe pain.    You have sudden stiffness and heaviness down both legs.    You have numbness or weakness in one leg, or pain in both legs.    You have numbness in your genital area or across your lower back.    You cannot control your urine or bowel movements.  When should I call my doctor?    You have a fever.    You have pain at night or when you rest.    Your pain does not get better with treatment.    You have pain that worsens when you cough or sneeze.    You suddenly feel something pop or snap in your back.    You have questions or concerns about your condition or care.

## 2025-03-31 ENCOUNTER — APPOINTMENT (OUTPATIENT)
Dept: ORTHOPEDIC SURGERY | Facility: CLINIC | Age: 75
End: 2025-03-31
Payer: MEDICARE

## 2025-03-31 DIAGNOSIS — M54.9 DORSALGIA, UNSPECIFIED: ICD-10-CM

## 2025-03-31 DIAGNOSIS — G89.29 DORSALGIA, UNSPECIFIED: ICD-10-CM

## 2025-03-31 PROCEDURE — 99024 POSTOP FOLLOW-UP VISIT: CPT

## 2025-04-29 ENCOUNTER — OUTPATIENT (OUTPATIENT)
Dept: OUTPATIENT SERVICES | Facility: HOSPITAL | Age: 75
LOS: 1 days | End: 2025-04-29
Payer: MEDICARE

## 2025-04-29 ENCOUNTER — APPOINTMENT (OUTPATIENT)
Dept: OPHTHALMOLOGY | Facility: CLINIC | Age: 75
End: 2025-04-29
Payer: MEDICARE

## 2025-04-29 DIAGNOSIS — Z98.890 OTHER SPECIFIED POSTPROCEDURAL STATES: Chronic | ICD-10-CM

## 2025-04-29 DIAGNOSIS — H53.8 OTHER VISUAL DISTURBANCES: ICD-10-CM

## 2025-04-29 PROCEDURE — 99214 OFFICE O/P EST MOD 30 MIN: CPT

## 2025-04-30 ENCOUNTER — APPOINTMENT (OUTPATIENT)
Dept: CARDIOLOGY | Facility: CLINIC | Age: 75
End: 2025-04-30
Payer: MEDICARE

## 2025-04-30 VITALS
DIASTOLIC BLOOD PRESSURE: 64 MMHG | HEART RATE: 74 BPM | WEIGHT: 182 LBS | SYSTOLIC BLOOD PRESSURE: 110 MMHG | HEIGHT: 60 IN | BODY MASS INDEX: 35.73 KG/M2 | OXYGEN SATURATION: 96 %

## 2025-04-30 DIAGNOSIS — G95.9 DISEASE OF SPINAL CORD, UNSPECIFIED: ICD-10-CM

## 2025-04-30 DIAGNOSIS — M47.14 OTHER SPONDYLOSIS WITH MYELOPATHY, THORACIC REGION: ICD-10-CM

## 2025-04-30 DIAGNOSIS — E78.00 PURE HYPERCHOLESTEROLEMIA, UNSPECIFIED: ICD-10-CM

## 2025-04-30 DIAGNOSIS — M54.12 DISEASE OF SPINAL CORD, UNSPECIFIED: ICD-10-CM

## 2025-04-30 DIAGNOSIS — I10 ESSENTIAL (PRIMARY) HYPERTENSION: ICD-10-CM

## 2025-04-30 PROCEDURE — 99214 OFFICE O/P EST MOD 30 MIN: CPT

## 2025-04-30 PROCEDURE — 93000 ELECTROCARDIOGRAM COMPLETE: CPT

## 2025-05-01 DIAGNOSIS — H04.123 DRY EYE SYNDROME OF BILATERAL LACRIMAL GLANDS: ICD-10-CM

## 2025-05-01 DIAGNOSIS — H25.13 AGE-RELATED NUCLEAR CATARACT, BILATERAL: ICD-10-CM

## 2025-05-01 DIAGNOSIS — H40.023 OPEN ANGLE WITH BORDERLINE FINDINGS, HIGH RISK, BILATERAL: ICD-10-CM

## 2025-05-19 ENCOUNTER — APPOINTMENT (OUTPATIENT)
Dept: ORTHOPEDIC SURGERY | Facility: CLINIC | Age: 75
End: 2025-05-19
Payer: MEDICARE

## 2025-05-19 DIAGNOSIS — Z98.1 ARTHRODESIS STATUS: ICD-10-CM

## 2025-05-19 PROCEDURE — 72100 X-RAY EXAM L-S SPINE 2/3 VWS: CPT

## 2025-05-19 PROCEDURE — 99213 OFFICE O/P EST LOW 20 MIN: CPT

## 2025-05-27 ENCOUNTER — OUTPATIENT (OUTPATIENT)
Dept: OUTPATIENT SERVICES | Facility: HOSPITAL | Age: 75
LOS: 1 days | End: 2025-05-27

## 2025-05-27 ENCOUNTER — APPOINTMENT (OUTPATIENT)
Dept: OPHTHALMOLOGY | Facility: CLINIC | Age: 75
End: 2025-05-27

## 2025-05-27 DIAGNOSIS — Z98.890 OTHER SPECIFIED POSTPROCEDURAL STATES: Chronic | ICD-10-CM

## 2025-05-27 DIAGNOSIS — H53.8 OTHER VISUAL DISTURBANCES: ICD-10-CM

## 2025-05-27 PROCEDURE — 92136 OPHTHALMIC BIOMETRY: CPT | Mod: 26

## 2025-06-05 ENCOUNTER — APPOINTMENT (OUTPATIENT)
Dept: CARDIOLOGY | Facility: CLINIC | Age: 75
End: 2025-06-05
Payer: MEDICARE

## 2025-06-05 VITALS
WEIGHT: 188 LBS | HEART RATE: 73 BPM | HEIGHT: 60 IN | BODY MASS INDEX: 36.91 KG/M2 | SYSTOLIC BLOOD PRESSURE: 104 MMHG | DIASTOLIC BLOOD PRESSURE: 78 MMHG

## 2025-06-05 PROCEDURE — 99214 OFFICE O/P EST MOD 30 MIN: CPT

## 2025-06-05 PROCEDURE — 93000 ELECTROCARDIOGRAM COMPLETE: CPT | Mod: NC

## 2025-06-05 RX ORDER — ERGOCALCIFEROL 1.25 MG/1
1.25 MG CAPSULE, LIQUID FILLED ORAL
Refills: 0 | Status: ACTIVE | COMMUNITY

## 2025-06-05 RX ORDER — FERROUS SULFATE 325(65) MG
300 (60 FE) TABLET ORAL DAILY
Refills: 0 | Status: ACTIVE | COMMUNITY

## 2025-06-16 ENCOUNTER — APPOINTMENT (OUTPATIENT)
Dept: ORTHOPEDIC SURGERY | Facility: CLINIC | Age: 75
End: 2025-06-16

## 2025-06-20 ENCOUNTER — APPOINTMENT (OUTPATIENT)
Dept: ORTHOPEDIC SURGERY | Facility: CLINIC | Age: 75
End: 2025-06-20
Payer: MEDICARE

## 2025-06-20 PROCEDURE — 20552 NJX 1/MLT TRIGGER POINT 1/2: CPT

## 2025-06-30 ENCOUNTER — OUTPATIENT (OUTPATIENT)
Dept: OUTPATIENT SERVICES | Facility: HOSPITAL | Age: 75
LOS: 1 days | Discharge: HOME | End: 2025-06-30
Payer: MEDICARE

## 2025-06-30 VITALS
RESPIRATION RATE: 16 BRPM | HEIGHT: 60 IN | OXYGEN SATURATION: 98 % | SYSTOLIC BLOOD PRESSURE: 153 MMHG | TEMPERATURE: 98 F | HEART RATE: 76 BPM | WEIGHT: 184.97 LBS | DIASTOLIC BLOOD PRESSURE: 72 MMHG

## 2025-06-30 VITALS — DIASTOLIC BLOOD PRESSURE: 70 MMHG | RESPIRATION RATE: 18 BRPM | HEART RATE: 73 BPM | SYSTOLIC BLOOD PRESSURE: 122 MMHG

## 2025-06-30 DIAGNOSIS — H25.22 AGE-RELATED CATARACT, MORGAGNIAN TYPE, LEFT EYE: ICD-10-CM

## 2025-06-30 DIAGNOSIS — Z98.890 OTHER SPECIFIED POSTPROCEDURAL STATES: Chronic | ICD-10-CM

## 2025-06-30 LAB — GLUCOSE BLDC GLUCOMTR-MCNC: 103 MG/DL — HIGH (ref 70–99)

## 2025-06-30 PROCEDURE — 66984 XCAPSL CTRC RMVL W/O ECP: CPT | Mod: LT

## 2025-06-30 PROCEDURE — V2632: CPT

## 2025-06-30 PROCEDURE — 82962 GLUCOSE BLOOD TEST: CPT

## 2025-06-30 RX ORDER — BUSPIRONE HYDROCHLORIDE 15 MG/1
1 TABLET ORAL
Refills: 0 | DISCHARGE

## 2025-06-30 NOTE — ASU PATIENT PROFILE, ADULT - NSICDXPASTMEDICALHX_GEN_ALL_CORE_FT
PAST MEDICAL HISTORY:  Asthma     Diabetes     GERD (gastroesophageal reflux disease)     High cholesterol     Hypertension     Sciatica      PAST MEDICAL HISTORY:  Anxiety     Anxiety and depression     Asthma     COPD exacerbation     Diabetes     GERD (gastroesophageal reflux disease)     High cholesterol     Hypertension     Incontinent of urine     Sciatica

## 2025-06-30 NOTE — ASU PATIENT PROFILE, ADULT - FALL HARM RISK - HARM RISK INTERVENTIONS
Assistance with ambulation/Assistance OOB with selected safe patient handling equipment/Communicate Risk of Fall with Harm to all staff/Discuss with provider need for PT consult/Monitor gait and stability/Provide patient with walking aids - walker, cane, crutches/Reinforce activity limits and safety measures with patient and family/Sit up slowly, dangle for a short time, stand at bedside before walking/Tailored Fall Risk Interventions/Use of alarms - bed, chair and/or voice tab/Visual Cue: Yellow wristband and red socks/Bed in lowest position, wheels locked, appropriate side rails in place/Call bell, personal items and telephone in reach/Instruct patient to call for assistance before getting out of bed or chair/Non-slip footwear when patient is out of bed/[place] to call system/Physically safe environment - no spills, clutter or unnecessary equipment/Purposeful Proactive Rounding/Room/bathroom lighting operational, light cord in reach

## 2025-06-30 NOTE — ASU PATIENT PROFILE, ADULT - NSICDXPASTSURGICALHX_GEN_ALL_CORE_FT
PAST SURGICAL HISTORY:  H/O rotator cuff surgery right shoulder    History of cholecystectomy      PAST SURGICAL HISTORY:  H/O rotator cuff surgery right shoulder    History of cholecystectomy     S/P lumbar laminectomy

## 2025-06-30 NOTE — ASU PREOP CHECKLIST - NS PREOP CHK HIBICLENS NA
N/A
Spine appears normal, diffuse ecchymosis of the RUE, tenderness of the humerus. arm in sling, neurovascularly intact.

## 2025-06-30 NOTE — ASU PATIENT PROFILE, ADULT - REASON FOR ADMISSION, PROFILE
Acute and Diagnostic Services Clinic Visit    Assessment & Plan        Diagnosis Comments   1. Neural foraminal stenosis of cervical spine  gabapentin (NEURONTIN) 300 MG capsule, predniSONE (DELTASONE) 20 MG tablet, tiZANidine (ZANAFLEX) 2 MG tablet, Spine  Referral, Adult ENT  Referral       2. Cervical radiculopathy  CT Cervical Spine w/o Contrast, ketorolac (TORADOL) injection 30 mg, Spine  Referral       3. Pulsatile tinnitus  US Carotid Bilateral, Adult ENT  Referral       4. Neck pain  CT Cervical Spine w/o Contrast, CBC with platelets, Comprehensive metabolic panel, ketorolac (TORADOL) injection 30 mg       5. Mastoid pain, right        6. Muscle spasm        7. Asymmetrical hearing loss              Vitals are stable  Patient is hyperalgesic on exam.  Patient is reporting same level of pain with mild touch and deep palpation of the mastoid area, neck and right shoulder and elbow.  This is making it difficult to pinpoint exactly where the area of concern is.  Patient has hypertonic SCM, mild clicking of the TMJ and significant hypertonicity of the right trapezius.  Anatomically, the ear appears reassuring.  No neurologic deficits noted.  Most of her pain in her arm is over the bicep muscle region, laterally.  Also has some evidence of lateral epicondylitis.  There is some tenderness along the mastoid but no evidence of infection  I suspect that the majority of the patient's symptoms are related to her muscle pain and cervical radiculopathy.  However, there is concern given ongoing reported pulsatile tinnitus and hearing loss.  Will opt for carotid ultrasound, CT of the cervical spine.  Will get some baseline evaluation rule out secondary etiologies for her symptoms.  Will give Toradol for pain control.    2 PM:  Reassuring without any evidence of infection.  CMP was also reassuring.  CT cervical spine shows multilevel degenerative changes with mild to moderate foraminal  "narrowing C5-C6.  No high-grade spinal canal stenosis was noted.  Suspect that patient's radiculopathy symptoms are in the setting of arthritis in the neck and muscle spasms.  Patient feels much improved after the Toradol and that is reassuring as well  I will send her to the spine center for ongoing management of pain  Will augment her current medication regiment with: Gabapentin 300 mg 3 times daily, burst of prednisone 40 mg times once today and tizanidine as needed.  Carotid ultrasound was reassuring without any significant stenosis.  Pulsatile tinnitus is not new but she is having slightly hearing loss.  I put in for an urgent referral to ENT        59 minutes were spent doing chart review, history and exam, documentation and further activities per the note.      No follow-ups on file.    Magalis Whipple is a 65 year old, presenting for the following health issues:  Ear Problem    HPI       Patient was seen in the primary care setting by provider today.  She was reporting pulsatile tinnitus, worsening hearing loss and significant pain in the mastoid area.  There was concern for mastoiditis or potential acoustic neurom vs infection.  They referred to [facility] for higher level of care.    Patient recently moved to [place].  Previously, all of her care was in [place].    Her daughter is here and she is translating on behalf of of her mother.    She reports that she has had right-sided \"whooshing sounds\" in her ear for 4 years.  This is a chronic issue.  She has had some decreased hearing that has accelerated over the last 6 months.  She is able to hear out of the right ear but it is worse than it has been in the past.  She reports ongoing significant right-sided neck pain, pain near the mastoid area, shoulder and radiating into her arm.  Pain in the right side of her neck is not running either.  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  The pain in her arm can be " "shooting or burning before he can feel if someone is using an ice pack.  It is there constantly with acute flares.  The patient found in her ears and is correlated with severity of the pain into her right neck and right arm.  She has significant weakness in her right arm related to pain.  She has not been able to  fatigue up and is not able to do some of the things she usually does.  She feels that her arms are very stiff.  No fevers or chills associated with it.  When she takes Tylenol, it does help..  The right arm pain has been there for many years and it sounds like she was following with orthopedics for this in [place].  She used to get \"injections\" in her arms that did help.    She moved to [place] after she quit her job at the meat factory and does not have any of the specialist.  She follows with.            Review of Systems  As per HPI       Objective    /78   Pulse 69   Temp 97.3  F (36.3  C)   Resp 20   Ht 1.695 m (5' 6.75\")   Wt 91.9 kg (202 lb 8 oz)   SpO2 99%   BMI 31.95 kg/m    Body mass index is 31.95 kg/m .  Physical Exam   GENERAL: alert and moderate distress  HEENT: External ears, ear canals and TMs flat and normal bilaterally.  NECK: no adenopathy, no asymmetry, masses, or scars  RESP: lungs clear to auscultation - no rales, rhonchi or wheezes  CV: regular rate and rhythm, no murmur, click or rub, no peripheral edema  ABDOMEN: soft, nontender, no hepatosplenomegaly, no masses and bowel sounds normal  MS: no gross musculoskeletal defects noted, no edema  Patient is hyperalgesic and is wincing in pain with mild/light touch of the right SCM,, right trapezius muscle and right shoulder joint.  Patient has hypertonic paraspinal musculature on the right side of the neck.  Patient has good ROM of the right shoulder.  Has tenderness to palpation along the bicep muscle on the right upper extremity.  No tenderness to palpation along the lateral epicondyles.  Has tenderness to " palpation along the mastoid near the SCM insertion.  General tenderness to palpation of the tragus on the right side as well.  PSYCH: mentation appears normal, affect normal/bright            Signed Electronically by: [facility] PROVIDER    Results for orders placed or performed during the hospital encounter of 07/11/24   US Carotid Bilateral     Status: None    Narrative    BILATERAL CAROTID ULTRASOUND   7/11/2024 2:21 PM     HISTORY:  Pulsatile tinnitus.    COMPARISON: None.    RIGHT CAROTID FINDINGS:  There is minimal atherosclerotic plaque at  the carotid bifurcation and proximal internal carotid artery.  Right ICA PSV:  99  cm/sec.  Right ICA EDV:  31 cm/sec.  Right ICA/CCA PSV Ratio:  1.6    These indicate less than 50% diameter stenosis of the right ICA.    Right Vertebral: Antegrade flow.   Right ECA: Antegrade flow.     LEFT CAROTID FINDINGS:  There is minimal atherosclerotic plaque at the  carotid bifurcation and proximal internal carotid artery.  Left ICA PSV:  106  cm/sec.  Left ICA EDV:  32 cm/sec.  Left ICA/CCA PSV Ratio:  1.6    These indicate less than 50% diameter stenosis of the left ICA.    Left Vertebral: Antegrade flow.   Left ECA: Antegrade flow.     Causes of Decreased Accuracy:   None.       Impression    IMPRESSION:    1. Less than 50% diameter stenosis of the right ICA relative to the  distal ICA diameter.   2. Less than 50% diameter stenosis of the left ICA relative to the  distal ICA diameter.     RADHA ZENDEJAS MD         SYSTEM ID:  [id number]   Results for orders placed or performed in visit on 07/11/24   CT Cervical Spine w/o Contrast     Status: None    Narrative    CT CERVICAL SPINE W/O CONTRAST  [facility]  7/11/2024 1:33 PM CDT    INDICATION:  Cervical radiculopathy, Neck pain  TECHNIQUE: CT scan of the cervical spine without contrast. Dose reduction techniques were used.  COMPARISON: None.    FINDINGS: Straightening of the usual cervical lordosis.  Cervical vertebral body heights preserved.  No acute cervical spine fracture. No prevertebral soft tissue swelling. Multilevel degenerative changes without evidence for high-grade spinal canal   narrowing. Neural foraminal narrowing is mild to moderate at C3-C4 and on the left at C5-C6.      Impression    IMPRESSION:   1.  No acute cervical spine fracture.  2.  Degenerative changes without high-grade central spinal canal stenosis. Mild to moderate foraminal narrowing at C3-C4 and on the left at C5-C6.     Results for orders placed or performed in visit on 07/11/24   CBC with platelets     Status: Normal   Result Value Ref Range    WBC Count 5.0 4.0 - 11.0 10e3/uL    RBC Count 4.56 3.80 - 5.20 10e6/uL    Hemoglobin 13.7 11.7 - 15.7 g/dL    Hematocrit 42.0 35.0 - 47.0 %    MCV 92 78 - 100 fL    MCH 30.0 26.5 - 33.0 pg    MCHC 32.6 31.5 - 36.5 g/dL    RDW 12.3 10.0 - 15.0 %    Platelet Count 203 150 - 450 10e3/uL   Comprehensive metabolic panel     Status: Abnormal   Result Value Ref Range    Sodium 141 135 - 145 mmol/L    Potassium 4.2 3.4 - 5.3 mmol/L    Carbon Dioxide (CO2) 25 22 - 29 mmol/L    Anion Gap 10 7 - 15 mmol/L    Urea Nitrogen 6.0 (L) 8.0 - 23.0 mg/dL    Creatinine 0.50 (L) 0.51 - 0.95 mg/dL    GFR Estimate >90 >60 mL/min/1.73m2    Calcium 9.5 8.8 - 10.2 mg/dL    Chloride 106 98 - 107 mmol/L    Glucose 103 (H) 70 - 99 mg/dL    Alkaline Phosphatase 100 40 - 150 U/L    AST 24 0 - 45 U/L    ALT 15 0 - 50 U/L    Protein Total 7.7 6.4 - 8.3 g/dL    Albumin 4.1 3.5 - 5.2 g/dL    Bilirubin Total 0.8 <=1.2 mg/dL        Left eye cataract

## 2025-07-01 ENCOUNTER — OUTPATIENT (OUTPATIENT)
Dept: OUTPATIENT SERVICES | Facility: HOSPITAL | Age: 75
LOS: 1 days | End: 2025-07-01
Payer: MEDICARE

## 2025-07-01 ENCOUNTER — APPOINTMENT (OUTPATIENT)
Dept: OPHTHALMOLOGY | Facility: CLINIC | Age: 75
End: 2025-07-01
Payer: MEDICARE

## 2025-07-01 DIAGNOSIS — Z98.890 OTHER SPECIFIED POSTPROCEDURAL STATES: Chronic | ICD-10-CM

## 2025-07-01 PROBLEM — J44.1 CHRONIC OBSTRUCTIVE PULMONARY DISEASE WITH (ACUTE) EXACERBATION: Chronic | Status: ACTIVE | Noted: 2025-06-30

## 2025-07-01 PROBLEM — F41.9 ANXIETY DISORDER, UNSPECIFIED: Chronic | Status: ACTIVE | Noted: 2025-06-30

## 2025-07-01 PROCEDURE — 99024 POSTOP FOLLOW-UP VISIT: CPT

## 2025-07-06 DIAGNOSIS — E11.51 TYPE 2 DIABETES MELLITUS WITH DIABETIC PERIPHERAL ANGIOPATHY WITHOUT GANGRENE: ICD-10-CM

## 2025-07-06 DIAGNOSIS — F32.A DEPRESSION, UNSPECIFIED: ICD-10-CM

## 2025-07-06 DIAGNOSIS — K21.9 GASTRO-ESOPHAGEAL REFLUX DISEASE WITHOUT ESOPHAGITIS: ICD-10-CM

## 2025-07-06 DIAGNOSIS — E03.9 HYPOTHYROIDISM, UNSPECIFIED: ICD-10-CM

## 2025-07-06 DIAGNOSIS — E66.9 OBESITY, UNSPECIFIED: ICD-10-CM

## 2025-07-06 DIAGNOSIS — E78.2 MIXED HYPERLIPIDEMIA: ICD-10-CM

## 2025-07-06 DIAGNOSIS — Z79.85 LONG-TERM (CURRENT) USE OF INJECTABLE NON-INSULIN ANTIDIABETIC DRUGS: ICD-10-CM

## 2025-07-06 DIAGNOSIS — H25.12 AGE-RELATED NUCLEAR CATARACT, LEFT EYE: ICD-10-CM

## 2025-07-06 DIAGNOSIS — J44.9 CHRONIC OBSTRUCTIVE PULMONARY DISEASE, UNSPECIFIED: ICD-10-CM

## 2025-07-06 DIAGNOSIS — E11.36 TYPE 2 DIABETES MELLITUS WITH DIABETIC CATARACT: ICD-10-CM

## 2025-07-06 DIAGNOSIS — I10 ESSENTIAL (PRIMARY) HYPERTENSION: ICD-10-CM

## 2025-07-06 DIAGNOSIS — Z79.82 LONG TERM (CURRENT) USE OF ASPIRIN: ICD-10-CM

## 2025-07-07 ENCOUNTER — APPOINTMENT (OUTPATIENT)
Dept: ORTHOPEDIC SURGERY | Facility: CLINIC | Age: 75
End: 2025-07-07
Payer: MEDICARE

## 2025-07-07 PROCEDURE — 20552 NJX 1/MLT TRIGGER POINT 1/2: CPT

## 2025-07-14 ENCOUNTER — APPOINTMENT (OUTPATIENT)
Dept: ORTHOPEDIC SURGERY | Facility: CLINIC | Age: 75
End: 2025-07-14
Payer: MEDICARE

## 2025-07-14 ENCOUNTER — APPOINTMENT (OUTPATIENT)
Dept: PULMONOLOGY | Facility: CLINIC | Age: 75
End: 2025-07-14
Payer: MEDICARE

## 2025-07-14 VITALS
HEART RATE: 70 BPM | OXYGEN SATURATION: 97 % | SYSTOLIC BLOOD PRESSURE: 118 MMHG | WEIGHT: 185 LBS | DIASTOLIC BLOOD PRESSURE: 68 MMHG | BODY MASS INDEX: 36.13 KG/M2 | RESPIRATION RATE: 14 BRPM

## 2025-07-14 PROBLEM — J30.9 ALLERGIC RHINITIS, UNSPECIFIED SEASONALITY, UNSPECIFIED TRIGGER: Status: ACTIVE | Noted: 2025-07-14

## 2025-07-14 PROBLEM — M79.18 MYOFASCIAL PAIN SYNDROME OF LUMBAR SPINE: Status: ACTIVE | Noted: 2025-06-20

## 2025-07-14 PROCEDURE — 20552 NJX 1/MLT TRIGGER POINT 1/2: CPT

## 2025-07-14 PROCEDURE — 99214 OFFICE O/P EST MOD 30 MIN: CPT

## 2025-07-14 PROCEDURE — G2211 COMPLEX E/M VISIT ADD ON: CPT

## 2025-07-14 RX ORDER — FLUTICASONE FUROATE 100 UG/1
100 POWDER RESPIRATORY (INHALATION) DAILY
Qty: 1 | Refills: 5 | Status: ACTIVE | COMMUNITY
Start: 2025-07-14 | End: 1900-01-01

## 2025-07-29 ENCOUNTER — APPOINTMENT (OUTPATIENT)
Dept: OPHTHALMOLOGY | Facility: CLINIC | Age: 75
End: 2025-07-29
Payer: MEDICARE

## 2025-07-29 ENCOUNTER — OUTPATIENT (OUTPATIENT)
Dept: OUTPATIENT SERVICES | Facility: HOSPITAL | Age: 75
LOS: 1 days | End: 2025-07-29
Payer: MEDICARE

## 2025-07-29 DIAGNOSIS — Z98.890 OTHER SPECIFIED POSTPROCEDURAL STATES: Chronic | ICD-10-CM

## 2025-07-29 PROCEDURE — 92134 CPTRZ OPH DX IMG PST SGM RTA: CPT

## 2025-07-29 PROCEDURE — 99024 POSTOP FOLLOW-UP VISIT: CPT

## 2025-08-11 DIAGNOSIS — H40.023 OPEN ANGLE WITH BORDERLINE FINDINGS, HIGH RISK, BILATERAL: ICD-10-CM

## 2025-08-11 DIAGNOSIS — H04.123 DRY EYE SYNDROME OF BILATERAL LACRIMAL GLANDS: ICD-10-CM

## 2025-08-11 DIAGNOSIS — H25.13 AGE-RELATED NUCLEAR CATARACT, BILATERAL: ICD-10-CM

## 2025-08-20 ENCOUNTER — OUTPATIENT (OUTPATIENT)
Dept: OUTPATIENT SERVICES | Facility: HOSPITAL | Age: 75
LOS: 1 days | End: 2025-08-20
Payer: MEDICARE

## 2025-08-20 ENCOUNTER — APPOINTMENT (OUTPATIENT)
Dept: ENDOCRINOLOGY | Facility: CLINIC | Age: 75
End: 2025-08-20
Payer: MEDICARE

## 2025-08-20 VITALS
HEIGHT: 60 IN | BODY MASS INDEX: 37.69 KG/M2 | HEART RATE: 77 BPM | WEIGHT: 192 LBS | DIASTOLIC BLOOD PRESSURE: 71 MMHG | SYSTOLIC BLOOD PRESSURE: 121 MMHG

## 2025-08-20 DIAGNOSIS — E55.9 VITAMIN D DEFICIENCY, UNSPECIFIED: ICD-10-CM

## 2025-08-20 DIAGNOSIS — Z98.890 OTHER SPECIFIED POSTPROCEDURAL STATES: Chronic | ICD-10-CM

## 2025-08-20 DIAGNOSIS — E06.3 AUTOIMMUNE THYROIDITIS: ICD-10-CM

## 2025-08-20 DIAGNOSIS — E78.00 PURE HYPERCHOLESTEROLEMIA, UNSPECIFIED: ICD-10-CM

## 2025-08-20 DIAGNOSIS — E11.65 TYPE 2 DIABETES MELLITUS WITH HYPERGLYCEMIA: ICD-10-CM

## 2025-08-20 DIAGNOSIS — Z00.00 ENCOUNTER FOR GENERAL ADULT MEDICAL EXAMINATION WITHOUT ABNORMAL FINDINGS: ICD-10-CM

## 2025-08-20 LAB — GLUCOSE BLDC GLUCOMTR-MCNC: 396 MG/DL — HIGH (ref 70–99)

## 2025-08-20 PROCEDURE — 99204 OFFICE O/P NEW MOD 45 MIN: CPT

## 2025-08-20 PROCEDURE — 82962 GLUCOSE BLOOD TEST: CPT

## 2025-08-22 ENCOUNTER — APPOINTMENT (OUTPATIENT)
Dept: ORTHOPEDIC SURGERY | Facility: CLINIC | Age: 75
End: 2025-08-22

## 2025-08-28 ENCOUNTER — APPOINTMENT (OUTPATIENT)
Dept: ORTHOPEDIC SURGERY | Facility: CLINIC | Age: 75
End: 2025-08-28
Payer: MEDICARE

## 2025-08-28 DIAGNOSIS — M54.12 DISEASE OF SPINAL CORD, UNSPECIFIED: ICD-10-CM

## 2025-08-28 DIAGNOSIS — Z98.1 ARTHRODESIS STATUS: ICD-10-CM

## 2025-08-28 DIAGNOSIS — G95.9 DISEASE OF SPINAL CORD, UNSPECIFIED: ICD-10-CM

## 2025-08-28 PROCEDURE — 72100 X-RAY EXAM L-S SPINE 2/3 VWS: CPT

## 2025-08-28 PROCEDURE — 99214 OFFICE O/P EST MOD 30 MIN: CPT

## 2025-08-29 ENCOUNTER — APPOINTMENT (OUTPATIENT)
Dept: MRI IMAGING | Facility: CLINIC | Age: 75
End: 2025-08-29

## 2025-08-29 PROCEDURE — 72141 MRI NECK SPINE W/O DYE: CPT

## 2025-09-11 ENCOUNTER — APPOINTMENT (OUTPATIENT)
Dept: ORTHOPEDIC SURGERY | Facility: CLINIC | Age: 75
End: 2025-09-11
Payer: MEDICARE

## 2025-09-11 PROCEDURE — 99213 OFFICE O/P EST LOW 20 MIN: CPT

## 2025-09-16 ENCOUNTER — APPOINTMENT (OUTPATIENT)
Dept: PAIN MANAGEMENT | Facility: CLINIC | Age: 75
End: 2025-09-16
Payer: MEDICARE

## 2025-09-16 VITALS — HEIGHT: 60 IN | WEIGHT: 190 LBS | BODY MASS INDEX: 37.3 KG/M2

## 2025-09-16 DIAGNOSIS — M54.2 CERVICALGIA: ICD-10-CM

## 2025-09-16 PROBLEM — M54.12 CERVICAL RADICULOPATHY, ACUTE: Status: ACTIVE | Noted: 2025-09-11

## 2025-09-16 PROCEDURE — 99204 OFFICE O/P NEW MOD 45 MIN: CPT

## 2025-09-16 RX ORDER — MIRABEGRON 50 MG/1
TABLET, FILM COATED, EXTENDED RELEASE ORAL
Refills: 0 | Status: ACTIVE | COMMUNITY

## 2025-09-16 RX ORDER — DESVENLAFAXINE 25 MG/1
TABLET, EXTENDED RELEASE ORAL
Refills: 0 | Status: ACTIVE | COMMUNITY